# Patient Record
Sex: FEMALE | Race: WHITE | NOT HISPANIC OR LATINO | Employment: STUDENT | URBAN - METROPOLITAN AREA
[De-identification: names, ages, dates, MRNs, and addresses within clinical notes are randomized per-mention and may not be internally consistent; named-entity substitution may affect disease eponyms.]

---
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
## 2024-01-23 ENCOUNTER — TELEPHONE (OUTPATIENT)
Dept: FAMILY MEDICINE CLINIC | Facility: CLINIC | Age: 19
End: 2024-01-23
Payer: COMMERCIAL

## 2024-01-25 NOTE — TELEPHONE ENCOUNTER
[employer] called asking about the stress echo. They are going to cancel this and reschedule since they did not hear anything back. [employer] is asking to do peer to peer or if you just want to cancel this? Please advise.

## 2024-01-26 ENCOUNTER — HOSPITAL ENCOUNTER (OUTPATIENT)
Dept: MRI IMAGING | Facility: HOSPITAL | Age: 19
Discharge: HOME | End: 2024-01-26
Admitting: NURSE PRACTITIONER
Payer: COMMERCIAL

## 2024-01-26 ENCOUNTER — APPOINTMENT (OUTPATIENT)
Dept: CARDIOLOGY | Facility: HOSPITAL | Age: 19
End: 2024-01-26
Payer: COMMERCIAL

## 2024-01-26 DIAGNOSIS — M54.42 CHRONIC BILATERAL LOW BACK PAIN WITH BILATERAL SCIATICA: ICD-10-CM

## 2024-01-26 DIAGNOSIS — G89.29 CHRONIC BILATERAL LOW BACK PAIN WITH BILATERAL SCIATICA: ICD-10-CM

## 2024-01-26 DIAGNOSIS — M54.41 CHRONIC BILATERAL LOW BACK PAIN WITH BILATERAL SCIATICA: ICD-10-CM

## 2024-01-26 PROCEDURE — 72148 MRI LUMBAR SPINE W/O DYE: CPT

## 2024-02-09 ENCOUNTER — TELEPHONE (OUTPATIENT)
Dept: FAMILY MEDICINE CLINIC | Facility: CLINIC | Age: 19
End: 2024-02-09
Payer: COMMERCIAL

## 2024-02-09 DIAGNOSIS — R00.2 HEART PALPITATIONS: Primary | ICD-10-CM

## 2024-02-09 DIAGNOSIS — R00.0 TACHYCARDIA: ICD-10-CM

## 2024-02-09 NOTE — TELEPHONE ENCOUNTER
Called Ivanna, no answer. Left vm for Ivanna to call office back. Spoke with Randi in regards to this and she states next steps is to send her to cardiology.

## 2024-02-09 NOTE — TELEPHONE ENCOUNTER
Mom called regarding patients echo that was supposed to be at 8am this morning. Ivanna (Mom) stated that they never received a call that the appt was canceled. I told Ivanna I would address this with Froy to see what happened with the appointment. We can contact Ivanna or Linnette.

## 2024-02-12 NOTE — TELEPHONE ENCOUNTER
Called and spoke with both Linnette and Ivanna. They have been made aware and Linnette will be coming into the office tomorrow to discuss further options with back pain.

## 2024-02-13 ENCOUNTER — OFFICE VISIT (OUTPATIENT)
Dept: FAMILY MEDICINE CLINIC | Facility: CLINIC | Age: 19
End: 2024-02-13
Payer: COMMERCIAL

## 2024-02-13 VITALS
TEMPERATURE: 98.2 F | WEIGHT: 171.4 LBS | BODY MASS INDEX: 30.37 KG/M2 | HEART RATE: 85 BPM | SYSTOLIC BLOOD PRESSURE: 140 MMHG | DIASTOLIC BLOOD PRESSURE: 67 MMHG | HEIGHT: 63 IN | OXYGEN SATURATION: 98 %

## 2024-02-13 DIAGNOSIS — M47.816 FACET ARTHROPATHY, LUMBAR: ICD-10-CM

## 2024-02-13 DIAGNOSIS — R00.2 HEART PALPITATIONS: Primary | ICD-10-CM

## 2024-02-13 DIAGNOSIS — Z30.41 ENCOUNTER FOR SURVEILLANCE OF CONTRACEPTIVE PILLS: ICD-10-CM

## 2024-02-13 PROCEDURE — 99214 OFFICE O/P EST MOD 30 MIN: CPT | Performed by: NURSE PRACTITIONER

## 2024-02-13 RX ORDER — TIZANIDINE 4 MG/1
4 TABLET ORAL 3 TIMES DAILY
Qty: 60 TABLET | Refills: 0 | Status: SHIPPED | OUTPATIENT
Start: 2024-02-13

## 2024-02-13 RX ORDER — DROSPIRENONE AND ETHINYL ESTRADIOL 0.02-3(28)
1 KIT ORAL DAILY
Qty: 84 TABLET | Refills: 3 | Status: SHIPPED | OUTPATIENT
Start: 2024-02-13

## 2024-02-22 ENCOUNTER — HOSPITAL ENCOUNTER (OUTPATIENT)
Dept: CARDIOLOGY | Facility: HOSPITAL | Age: 19
Discharge: HOME | End: 2024-02-22
Admitting: NURSE PRACTITIONER
Payer: COMMERCIAL

## 2024-02-22 DIAGNOSIS — R00.2 HEART PALPITATIONS: ICD-10-CM

## 2024-02-22 PROCEDURE — 93246 EXT ECG>7D<15D RECORDING: CPT

## 2024-02-28 ENCOUNTER — TREATMENT (OUTPATIENT)
Dept: PHYSICAL THERAPY | Facility: CLINIC | Age: 19
End: 2024-02-28
Payer: COMMERCIAL

## 2024-02-28 DIAGNOSIS — M54.50 RIGHT-SIDED LOW BACK PAIN WITHOUT SCIATICA, UNSPECIFIED CHRONICITY: Primary | ICD-10-CM

## 2024-02-28 DIAGNOSIS — M62.81 MUSCLE WEAKNESS OF PROXIMAL EXTREMITY: ICD-10-CM

## 2024-02-28 NOTE — PROGRESS NOTES
Physical Therapy Initial Evaluation and Plan of Care      [facility]: [address] [place] [address]      Patient: Linnette Fischer   : 2005  Diagnosis/ICD-10 Code:  Right-sided low back pain without sciatica, unspecified chronicity [M54.50]  Referring practitioner: TALHA Henriquez  Date of Initial Visit: 2024  Today's Date: 2024  Patient seen for 1 sessions           Subjective Questionnaire: Oswestry:       Subjective   Pt reports to physical therapy w/ complaints of low back pain. For about 7 months, pt has been having increased pain at the gym and at night. Pt reports they feel like they have to hold something to sit down due to pain. Pt reports the pain does stay local to their back and feels like a stabbing pain. Pt reports the pain is to the middle of their back. Pt reports their pain can increase w/ sitting, walking, and laying flat. Pt reports they can decrease some of their pain w/ stretches (flexing, moving legs side to side). Pt reports they have tried medications, but they do not seem to help. Pt reports their current pain is a 7/10. Pt reports their pain can get as low as a 0/10. Pt reports the pain is infrequent, but is at a majority of a time 7/10. At times of increased pain, their pain can reach a 10/10.       Pt occupation: [employer]; sitting computer.     Past Medical Hx: Elevated HR ( wore a holter monitor, will be following up w/ cardiology)     MRI FINDINGS:          The alignment is anatomic.  The vertebral body heights appear normal.  The bone marrow signal is   within normal limits.  The intervertebral discs appear preserved in signal and height.  The conus   terminates at the L1-2 level.  The posterior paravertebral soft tissues are unremarkable.     L1-2:  Mild bilateral facet arthropathy.  No spinal canal stenosis.  No neural foramina stenosis.     L2-3:  No spinal canal or neural foramina stenosis.     L3-4:  Mild bilateral facet  arthropathy.  No spinal canal stenosis.  No neural foramina stenosis.     L4-5:  No spinal canal or neural foramina stenosis.     L5-S1:  No spinal canal or neural foramina stenosis.     IMPRESSION:               Minor degenerative changes of lumbar spine as described above.         Electronically Signed and Approved By: RAISA DAVIS MD on 1/26/2024 at 8:42      Pain upon conclusion of therapy session: 4/10        Objective          Static Posture     Comments  Maintains anterior pelvic tilt; has decreased pain when moved out of this position.     Palpation   Left   Tenderness of the iliopsoas.     Right Tenderness of the iliopsoas.     Additional Palpation Details  Increased tenderness on the R w/ palpation to psoas. This refers to pt's familiar low back pain.     Tenderness     Lumbar Spine  Tenderness in the facet joint.     Left Hip   No tenderness in the PSIS.     Right Hip   No tenderness in the PSIS.     Additional Tenderness Details  Most pain w/ central PA testing at L2.     Pain unilaterally:   R: L3-4  L: L4-5      Core contraction increases pain in lumbar spine.     Active Range of Motion     Lumbar   Flexion: 40 degrees   Extension: 10 degrees with pain    Additional Active Range of Motion Details  More pain w/ ext     R ROT: WFL  L ROT: 75%      Strength/Myotome Testing     Left Hip   Planes of Motion   Flexion: 4  Extension: 4  Abduction: 4    Right Hip   Planes of Motion   Flexion: 4  Extension: 4  Abduction: 4    Left Knee   Flexion: 5  Extension: 4+    Right Knee   Flexion: 5  Extension: 4+    Additional Strength Details  Feel of pull in their R low back w/ hip flexion and knee ext    Tests     Lumbar     Left   Positive quadrant.     Right   Negative quadrant.     Additional Tests Details  Pt has decreased pain w/ juliette pose stretch as well as w/ proper mechanics for PPT.     Ambulation     Observational Gait   Gait: within functional limits       See Exercise, Manual, and Modality Logs for  complete treatment.       Assessment & Plan       Assessment  Impairments: abnormal coordination, abnormal muscle firing, abnormal or restricted ROM, activity intolerance, impaired physical strength, lacks appropriate home exercise program and pain with function   Functional limitations: carrying objects, lifting, sleeping, walking, pulling, pushing, uncomfortable because of pain, sitting, standing, stooping and unable to perform repetitive tasks   Assessment details: Pt reports to physical therapy w/ complaints of low back pain. Pt has familiar symptoms w/ facet testing as well as w/ activation of hip flexors. Pt presents w/ decreased ROM, decreased strength, decreased ability to contract core musculature, as well as pain w/ functional motions. Pt has increased perceived deficits described by LEDA. Pt does have decreased back pain after performance of HEP exs today. Pt was educated on spinal anatomy and low back pain. Pt will benefit from skilled physical therapy, to address their current impairments and limitations, in order to improve upon levels of pain to return to all ADLs and daily tasks safely and without restrictions.       Prognosis: good    Goals  Plan Goals: LOW BACK PROBLEMS:    1. The patient complains of low back pain.  LTG 1: 12 weeks:  The patient will report a pain rating of 1/10 or better in order to improve  tolerance to activities of daily living and work tasks.  STATUS:  New  STG 1a: 6 weeks:  The patient will report a pain rating of 3/10 or better.  STATUS:  New  TREATMENT:  Therapeutic exercises, manual therapy, aquatic therapy, home exercise   instruction, and modalities as needed for pain to include:  electrical stimulation, moist heat, and ice.      2. The patient demonstrates weakness of the B hip.  LTG 2: 12 weeks:  The patient will demonstrate 5 /5 strength for B hip flexion, abduction,  and extension in order to improve hip stability.  STATUS:  New  STG 2a: 6 weeks:  The patient will  demonstrate 4+ /5 strength for B hip flexion, abduction,  and extension.  STATUS:  New  TREATMENT: Therapeutic exercises, manual therapy, aquatic therapy, home exercise instruction,  and modalities as needed for pain to include:  electrical stimulation, moist heat, and ice.      3. The patient has limited lumbar AROM  LTG 3: 12 weeks:  The patient will demonstrate lumbar AROM as follows: 100% B lumbar rotation, without pain.  STATUS:  New  TREATMENT: Manual therapy, therapeutic exercise, home exercise instruction, and modalities as needed to include: moist heat, and electrical stimulation.       4. Mobility: Walking/Moving Around Functional Limitation    LTG 4: 12 weeks:  The patient will demonstrate 7 % limitation by achieving a score of 3/45 on the LEDA.  STATUS:  New  STG 4 a: 6 weeks:  The patient will demonstrate 20 % limitation by achieving a score of 9/45 on the LEDA.    STATUS:  New  TREATMENT:  Manual therapy, therapeutic exercise, home exercise instruction, and modalities as needed to include: moist heat, electrical stimulation, and ultrasound.           PLAN:  Therapy options: will receive skilled therapy services  Planned modality interventions: Cryotherapy, Heat, and TENS  Planned therapy interventions:balance/weight-bearing training, ADL retraining, soft tissue mobilization, strengthening, stretching, therapeutic activities, manual therapy, joint mobilization, home exercise program/patient education, gait training, functional ROM exercises, flexibility, body mechanics training, postural training, and neuromuscular re-education  Frequency: 2x per week  Duration in weeks: 12  Treatment plan discussed with: patient      Visit Diagnoses:    ICD-10-CM ICD-9-CM   1. Right-sided low back pain without sciatica, unspecified chronicity  M54.50 724.2   2. Muscle weakness of proximal extremity  M62.81 728.87       History # of Personal Factors and/or Comorbidities: LOW (0)  Examination of Body System(s): # of  elements: LOW (1-2)  Clinical Presentation: STABLE   Clinical Decision Making: LOW       Timed:         Manual Therapy:    0     mins  68816;     Therapeutic Exercise:    25     mins  68938;     Neuromuscular Sasha:    0    mins  49812;    Therapeutic Activity:     0     mins  36328;     Gait Trainin     mins  92794;     Ultrasound:     0     mins  64573;    Ionto                               0    mins   81517  Self Care                       0     mins   01597  Canalith Repos    0     mins 39779      Un-Timed:  Electrical Stimulation:    0     mins  41402 ( );  Dry Needling     0     mins self-pay  Traction     0     mins 13270  Low Eval     25     Mins  66967  Mod Eval     0     Mins  59348  High Eval                       0     Mins  14471  Re-Eval                           0    mins  66046    Timed Treatment:   25   mins   Total Treatment:     50   mins    PT SIGNATURE: Renny Lockhart PT, DPT    Electronically signed 2024    [place] License: PT - [id number]    Initial Certification  Certification Period: 2024 thru 2024  I certify that the therapy services are furnished while this patient is under my care.  The services outlined above are required by this patient, and will be reviewed every 90 days.     PHYSICIAN: Randi Singh APRN  NPI: [id number]      DATE:     Please sign and return via fax to [phone]. Thank you, [facility].

## 2024-03-06 ENCOUNTER — TREATMENT (OUTPATIENT)
Dept: PHYSICAL THERAPY | Facility: CLINIC | Age: 19
End: 2024-03-06
Payer: COMMERCIAL

## 2024-03-06 DIAGNOSIS — M54.50 RIGHT-SIDED LOW BACK PAIN WITHOUT SCIATICA, UNSPECIFIED CHRONICITY: Primary | ICD-10-CM

## 2024-03-06 DIAGNOSIS — M62.81 MUSCLE WEAKNESS OF PROXIMAL EXTREMITY: ICD-10-CM

## 2024-03-06 NOTE — PROGRESS NOTES
Outpatient Physical Therapy  [address] [facility], [place] [address]                            Physical Therapy Daily Treatment Note    Patient: Linnette Fischer   : 2005  Diagnosis/ICD-10 Code:  Right-sided low back pain without sciatica, unspecified chronicity [M54.50]  Referring practitioner: TALHA Henriquez  Date of Initial Visit: Type: THERAPY  Noted: 2024  Today's Date: 3/6/2024  Patient seen for 2 sessions           Subjective   Linnette Fischer reports: that her back pain is about the same. States that she is still working out at the gym and it will bother her more if she does back day. Reports that she does a lot of sitting at work and after a while it hurt and she has to stand up.     Objective   Minimal discomfort with MFR to R QL.    See Exercise, Manual, and Modality Logs for complete treatment.     Assessment/Plan  Linnette still experiencing increased R low back  pain, especially when sitting to long. Pt had some increased discomfort with manual therapy. Pt would benefit from skilled PT to address Range of Motion  and Strength deficits, pain management and any concerns with ADLs.       Progress per Plan of Care         Timed:  Manual Therapy:    10     mins  30347;  Therapeutic Exercise:    10     mins  95260;     Neuromuscular Sasha:    10    mins  22131;    Therapeutic Activity:          mins  08696;     Gait Training:           mins  79918;        Untimed:  Electrical Stimulation:         mins  04125 ( );  Mechanical Traction:         mins  67683;       Timed Treatment:   30   mins   Total Treatment:     30   mins      Electronically signed:     Alejandra Hassan PTA  Physical Therapist Assistant  [facility] License #: [id number]

## 2024-03-11 ENCOUNTER — TREATMENT (OUTPATIENT)
Dept: PHYSICAL THERAPY | Facility: CLINIC | Age: 19
End: 2024-03-11
Payer: COMMERCIAL

## 2024-03-11 DIAGNOSIS — M62.81 MUSCLE WEAKNESS OF PROXIMAL EXTREMITY: ICD-10-CM

## 2024-03-11 DIAGNOSIS — M54.50 RIGHT-SIDED LOW BACK PAIN WITHOUT SCIATICA, UNSPECIFIED CHRONICITY: Primary | ICD-10-CM

## 2024-03-11 NOTE — PROGRESS NOTES
Physical Therapy Daily Treatment Note  [place] [facility] [address] [place] [address]    Patient: Linnette Fischer   : 2005  Referring practitioner: TALHA Henriquez  Date of Initial Visit: Type: THERAPY  Noted: 2024  Today's Date: 3/11/2024  Patient seen for 3 sessions           Subjective  Linnette Fischer reports: hr pain comes and goes in waves. She explained that she feels more when she is in the bed. Linnette reported she is attending the gym.      Objective   See Exercise, Manual, and Modality Logs for complete treatment.     Assessment/Plan  Linnette is just beginning care to attend to deficits outlined in IE.    Visit Diagnoses:    ICD-10-CM ICD-9-CM   1. Right-sided low back pain without sciatica, unspecified chronicity  M54.50 724.2   2. Muscle weakness of proximal extremity  M62.81 728.87       Progress per Plan of Care and Progress strengthening /stabilization /functional activity           Timed:  Manual Therapy:         mins  52666;  Therapeutic Exercise:    12     mins  79531;     Neuromuscular Sasha:        mins  36202;    Therapeutic Activity:     15     mins  20817;     Gait Training:           mins  55883;     Ultrasound:          mins  37647;    Electrical Stimulation:         mins  60289 ( );  Aquatics  __   mins   93276    Untimed:  Electrical Stimulation:         mins  88849 ( );  Mechanical Traction:         mins  42574;     Timed Treatment: 27     mins   Total Treatment:     27   mins    Electronically Signed:  Megan Grewal PTA  Physical Therapist Assistant    [place] PTA license [id number]

## 2024-03-13 ENCOUNTER — TREATMENT (OUTPATIENT)
Dept: PHYSICAL THERAPY | Facility: CLINIC | Age: 19
End: 2024-03-13
Payer: COMMERCIAL

## 2024-03-13 DIAGNOSIS — M62.81 MUSCLE WEAKNESS OF PROXIMAL EXTREMITY: ICD-10-CM

## 2024-03-13 DIAGNOSIS — M54.50 RIGHT-SIDED LOW BACK PAIN WITHOUT SCIATICA, UNSPECIFIED CHRONICITY: Primary | ICD-10-CM

## 2024-03-13 NOTE — PROGRESS NOTES
Physical Therapy Daily Treatment Note  [place] [facility] [address] [place] [address]    Patient: Linnette Fischer   : 2005  Referring practitioner: TALHA Henriquez  Date of Initial Visit: Type: THERAPY  Noted: 2024  Today's Date: 3/13/2024  Patient seen for 4 sessions           Subjective  Linnette Fischer reports: she had no pain at arrival.      Objective   See Exercise, Manual, and Modality Logs for complete treatment.     Assessment/Plan  Dead bugs produced back pain so ceased this today. As Linnette progressed with her program, she had no pain. Ongoing care required to address remaining strength deficits to aid pain control.    Visit Diagnoses:    ICD-10-CM ICD-9-CM   1. Right-sided low back pain without sciatica, unspecified chronicity  M54.50 724.2   2. Muscle weakness of proximal extremity  M62.81 728.87       Progress per Plan of Care and Progress strengthening /stabilization /functional activity           Timed:  Manual Therapy:         mins  74578;  Therapeutic Exercise:    8     mins  22963;     Neuromuscular Sasha:        mins  65847;    Therapeutic Activity:     23     mins  55858;     Gait Training:           mins  50817;     Ultrasound:          mins  45200;    Electrical Stimulation:         mins  79316 ( );  Aquatics  __   mins   24391    Untimed:  Electrical Stimulation:         mins  93537 ( );  Mechanical Traction:         mins  35204;     Timed Treatment:   31   mins   Total Treatment:     31   mins    Electronically Signed:  Megan Grewal PTA  Physical Therapist Assistant    [place] PTA license [id number]

## 2024-03-22 ENCOUNTER — OFFICE VISIT (OUTPATIENT)
Dept: CARDIOLOGY | Facility: CLINIC | Age: 19
End: 2024-03-22
Payer: COMMERCIAL

## 2024-03-22 VITALS
HEART RATE: 91 BPM | BODY MASS INDEX: 30.65 KG/M2 | HEIGHT: 63 IN | SYSTOLIC BLOOD PRESSURE: 143 MMHG | WEIGHT: 173 LBS | DIASTOLIC BLOOD PRESSURE: 85 MMHG

## 2024-03-22 DIAGNOSIS — R00.2 PALPITATIONS: Primary | ICD-10-CM

## 2024-03-22 DIAGNOSIS — R06.09 DYSPNEA ON EXERTION: ICD-10-CM

## 2024-03-22 PROCEDURE — 93000 ELECTROCARDIOGRAM COMPLETE: CPT | Performed by: INTERNAL MEDICINE

## 2024-03-22 PROCEDURE — 99204 OFFICE O/P NEW MOD 45 MIN: CPT | Performed by: INTERNAL MEDICINE

## 2024-03-22 NOTE — PROGRESS NOTES
Chief Complaint  Palpitations and Rapid Heart Rate    Subjective            Linnette Fischer presents to [facility]  Palpitations   Associated symptoms include shortness of breath. Pertinent negatives include no chest pain.       18-year-old white female.  She has no previous cardiac history.  She is referred for palpitations.  With exercise she is having what she thinks is extremely high heart rates, her smart watch is at times telling her her heart rate is 200 bpm during cardiovascular type exercise.  She is experiencing some palpitations with this.  Sometimes it occurs with lesser activities.  She has noticed sometimes her heart rate goes up just with standing.  She had a Holter monitor done recently which showed overall average heart rate of 92.  Her maximum heart rate with exertion was 190 which is not unusual for a 18-year-old.  She had otherwise a very low frequency PACs.  She has never had syncope.  She denies chest pain.  No family history of congenital heart disease.    PMH  History reviewed. No pertinent past medical history.      SURGICALHX  Past Surgical History:   Procedure Laterality Date    EAR TUBES          SOC  Social History     Socioeconomic History    Marital status: Single   Tobacco Use    Smoking status: Never     Passive exposure: Never    Smokeless tobacco: Never   Vaping Use    Vaping status: Never Used   Substance and Sexual Activity    Alcohol use: Never    Drug use: Never    Sexual activity: Yes     Partners: Male     Birth control/protection: Birth control pill         FAMHX  Family History   Problem Relation Age of Onset    Osteoporosis Paternal Grandfather     Hypertension Paternal Grandfather     Diabetes Paternal Grandfather     Breast cancer Maternal Grandmother     Hypertension Maternal Grandmother     Colon cancer Neg Hx     Uterine cancer Neg Hx     Ovarian cancer Neg Hx           ALLERGY  No Known Allergies     MEDSCURRENT    Current Outpatient  "Medications:     drospirenone-ethinyl estradiol (NOAM) 3-0.02 MG per tablet, Take 1 tablet by mouth Daily., Disp: 84 tablet, Rfl: 3    diclofenac (VOLTAREN) 75 MG EC tablet, Take 1 tablet by mouth 2 (Two) Times a Day. (Patient not taking: Reported on 3/22/2024), Disp: 60 tablet, Rfl: 0    tiZANidine (ZANAFLEX) 4 MG tablet, Take 1 tablet by mouth 3 (Three) Times a Day. (Patient not taking: Reported on 3/22/2024), Disp: 60 tablet, Rfl: 0      Review of Systems   Constitutional: Negative.   HENT: Negative.     Eyes: Negative.    Cardiovascular:  Positive for palpitations. Negative for chest pain.   Respiratory:  Positive for shortness of breath.    Endocrine: Negative.    Hematologic/Lymphatic: Negative.    Skin: Negative.    Musculoskeletal: Negative.    Gastrointestinal: Negative.    Genitourinary: Negative.    Neurological: Negative.    Psychiatric/Behavioral: Negative.          Objective     /85   Pulse 91   Ht 160 cm (63\")   Wt 78.5 kg (173 lb)   BMI 30.65 kg/m²       General Appearance:   well developed  well nourished  HENT:   oropharynx moist  lips not cyanotic  Neck:  thyroid not enlarged  supple  Respiratory:  no respiratory distress  normal breath sounds  no rales  Cardiovascular:  no jugular venous distention  regular rhythm  apical impulse normal  S1 normal, S2 normal  no S3, no S4   no murmur  no rub, no thrill  carotid pulses normal; no bruit  pedal pulses normal  lower extremity edema: none    Musculoskeletal:  no clubbing of fingers.   normocephalic, head atraumatic  Skin:   warm, dry  Psychiatric:  judgement and insight appropriate  normal mood and affect      Result Review :     The following data was reviewed by: Farooq Juarez MD on 03/22/2024:    CMP          12/15/2023    10:41   CMP   Glucose 81    BUN 12    Creatinine 0.77    EGFR 114.1    Sodium 138    Potassium 4.2    Chloride 104    Calcium 9.6    Total Protein 6.9    Albumin 4.7    Globulin 2.2    Total Bilirubin 0.6  "   Alkaline Phosphatase 51    AST (SGOT) 20    ALT (SGPT) 18    Albumin/Globulin Ratio 2.1    BUN/Creatinine Ratio 15.6    Anion Gap 11.0      CBC          12/15/2023    10:41   CBC   WBC 6.85    RBC 4.25    Hemoglobin 14.0    Hematocrit 39.9    MCV 93.9    MCH 32.9    MCHC 35.1    RDW 11.7    Platelets 188        TSH          10/11/2023    09:11   TSH   TSH 2.320        Data reviewed : Baseline Holter monitor reviewed as above.  Primary care records reviewed         ECG 12 Lead    Date/Time: 3/22/2024 11:49 AM  Performed by: ANGEL Juarez MD    Authorized by: ANGEL Juarez MD  Comparison: not compared with previous ECG   Previous ECG: no previous ECG available  Rhythm: sinus rhythm  Conduction: conduction normal  ST Segments: ST segments normal  T Waves: T waves normal  QRS axis: normal  Other: no other findings    Clinical impression: normal ECG                    Assessment and Plan        ASSESSMENT:  Encounter Diagnoses   Name Primary?    Palpitations Yes    Dyspnea on exertion          PLAN:    1.  Palpitations and dyspnea-her heart rates are elevated with exercise but based on her age achieving a heart rate of 200 would not be unusual.  Her Holter monitor showed no dysrhythmias but she also did not exercise significantly during that time.  Her baseline EKG is normal.  I recommend that she curtail the use of caffeine and preworkout supplements.  A treadmill stress echo will be scheduled to evaluate for structural heart disease or exercise-induced dysrhythmia.  Overall I suspect it is low likelihood that she has underlying heart disease.  2.  We will discuss the diagnostic results when available otherwise the patient will be followed as needed            Patient was given instructions and counseling regarding her condition or for health maintenance advice. Please see specific information pulled into the AVS if appropriate.             ANGEL Juarez MD  3/22/2024    11:47 EDT

## 2024-05-08 ENCOUNTER — OFFICE VISIT (OUTPATIENT)
Dept: FAMILY MEDICINE CLINIC | Facility: CLINIC | Age: 19
End: 2024-05-08
Payer: COMMERCIAL

## 2024-05-08 VITALS
BODY MASS INDEX: 30.41 KG/M2 | OXYGEN SATURATION: 100 % | HEART RATE: 89 BPM | DIASTOLIC BLOOD PRESSURE: 73 MMHG | TEMPERATURE: 98.2 F | HEIGHT: 63 IN | SYSTOLIC BLOOD PRESSURE: 131 MMHG | WEIGHT: 171.6 LBS

## 2024-05-08 DIAGNOSIS — E66.09 CLASS 1 OBESITY DUE TO EXCESS CALORIES WITHOUT SERIOUS COMORBIDITY WITH BODY MASS INDEX (BMI) OF 30.0 TO 30.9 IN ADULT: Primary | ICD-10-CM

## 2024-05-08 PROBLEM — E66.811 CLASS 1 OBESITY DUE TO EXCESS CALORIES WITHOUT SERIOUS COMORBIDITY WITH BODY MASS INDEX (BMI) OF 30.0 TO 30.9 IN ADULT: Status: ACTIVE | Noted: 2024-05-08

## 2024-05-08 PROCEDURE — 99214 OFFICE O/P EST MOD 30 MIN: CPT | Performed by: NURSE PRACTITIONER

## 2024-08-02 ENCOUNTER — OFFICE VISIT (OUTPATIENT)
Dept: FAMILY MEDICINE CLINIC | Facility: CLINIC | Age: 19
End: 2024-08-02
Payer: COMMERCIAL

## 2024-08-02 VITALS
HEIGHT: 63 IN | WEIGHT: 163.4 LBS | OXYGEN SATURATION: 100 % | DIASTOLIC BLOOD PRESSURE: 67 MMHG | HEART RATE: 90 BPM | BODY MASS INDEX: 28.95 KG/M2 | TEMPERATURE: 98.7 F | SYSTOLIC BLOOD PRESSURE: 118 MMHG

## 2024-08-02 DIAGNOSIS — E66.3 OVERWEIGHT WITH BODY MASS INDEX (BMI) OF 28 TO 28.9 IN ADULT: Primary | ICD-10-CM

## 2024-08-02 PROCEDURE — 99213 OFFICE O/P EST LOW 20 MIN: CPT | Performed by: NURSE PRACTITIONER

## 2024-08-02 NOTE — PROGRESS NOTES
Chief Complaint  Class 1 obesity due to excess calories without serious gabriel (3 Mo follow up )    Subjective          Linnette Fischer is a 19 y.o. female who presents to [facility]    History of Present Illness  Obesity:  was 174 and now 163.  Total weight loss 11 pounds.  Goal weight 140 pounds   Zepbound caused her severe nausea and vomiting, extreme fatigue.  States she only took 1 shot from the lowest dose.  Now taking over the counter cortisol and taking Yes you can appetite suppressant and slim fit.  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    PHQ-2 Total Score: 0   PHQ-9 Total Score: 0        Review of Systems       Medical History: has no past medical history on file.     Surgical History: has a past surgical history that includes Ear Tubes Removal.     Family History: family history includes Breast cancer in her maternal grandmother; Diabetes in her paternal grandfather; Hypertension in her maternal grandmother and paternal grandfather; Osteoporosis in her paternal grandfather.     Social History: reports that she has never smoked. She has never been exposed to tobacco smoke. She has never used smokeless tobacco. She reports that she does not drink alcohol and does not use drugs.    Allergies: Patient has no known allergies.      Health Maintenance Due   Topic Date Due    HPV VACCINES (1 - 3-dose series) Never done    INFLUENZA VACCINE  08/01/2024            Current Outpatient Medications:     drospirenone-ethinyl estradiol (NOAM) 3-0.02 MG per tablet, Take 1 tablet by mouth Daily., Disp: 84 tablet, Rfl: 3      Immunization History   Administered Date(s) Administered    COVID-19 (PFIZER) Purple Cap Monovalent 08/03/2021, 09/07/2021         Objective       Vitals:    08/02/24 0704   BP: 118/67   BP Location: Right arm   Patient Position: Sitting   Cuff Size: Adult   Pulse: 90   Temp: 98.7 °F (37.1 °C)   TempSrc: Temporal   SpO2: 100%   Weight: 74.1 kg (163 lb 6.4 oz)   Height: 160  "cm (62.99\")      Body mass index is 28.95 kg/m².   Wt Readings from Last 3 Encounters:   08/02/24 74.1 kg (163 lb 6.4 oz)   05/08/24 77.8 kg (171 lb 9.6 oz)   03/22/24 78.5 kg (173 lb) (93%, Z= 1.44)*     * Growth percentiles are based on [facility] (Girls, 2-20 Years) data.      BP Readings from Last 3 Encounters:   08/02/24 118/67   05/08/24 131/73   03/22/24 143/85           Physical Exam  Vitals reviewed.   Constitutional:       Appearance: Normal appearance.   Cardiovascular:      Rate and Rhythm: Normal rate and regular rhythm.      Pulses: Normal pulses.      Heart sounds: Normal heart sounds.   Pulmonary:      Effort: Pulmonary effort is normal.      Breath sounds: Normal breath sounds.   Skin:     General: Skin is warm and dry.   Neurological:      Mental Status: She is alert and oriented to person, place, and time.   Psychiatric:         Mood and Affect: Mood normal.         Behavior: Behavior normal.         Thought Content: Thought content normal.         Judgment: Judgment normal.             Result Review :       Common labs          12/15/2023    10:41   Common Labs   Glucose 81    BUN 12    Creatinine 0.77    Sodium 138    Potassium 4.2    Chloride 104    Calcium 9.6    Albumin 4.7    Total Bilirubin 0.6    Alkaline Phosphatase 51    AST (SGOT) 20    ALT (SGPT) 18    WBC 6.85    Hemoglobin 14.0    Hematocrit 39.9    Platelets 188                       Assessment and Plan        Diagnoses and all orders for this visit:    1. Overweight with body mass index (BMI) of 28 to 28.9 in adult (Primary)  Comments:  Continue diet and exercise  F/U PRN          Follow Up     Return if symptoms worsen or fail to improve.    Patient was given instructions and counseling regarding her condition or for health maintenance advice. Please see specific information pulled into the AVS if appropriate. Patient understands the importance of having any ordered tests to be performed in a timely fashion.      I spent 10 minutes " caring for Linnette on this date of service. This time includes time spent by me in the following activities: preparing for the visit, reviewing tests, performing a medically appropriate examination and/or evaluation, counseling and educating the patient/family/caregiver, referring and communicating with other health care professionals, documenting information in the medical record, and care coordination      Randi Singh, APRN

## 2024-08-14 ENCOUNTER — DOCUMENTATION (OUTPATIENT)
Dept: PHYSICAL THERAPY | Facility: CLINIC | Age: 19
End: 2024-08-14
Payer: COMMERCIAL

## 2024-08-14 NOTE — PROGRESS NOTES
Outpatient Physical Therapy  [address], [place] [address]      Discharge Summary  Discharge Summary from Physical Therapy Report      Dates  PT visit: 2/28/24-3/13/24  Number of Visits: 4       Goals: Partially Met    Discharge Plan: Continue with current home exercise program as instructed    Date of Discharge 8/14/2024      Electronically signed:   Alejandra Hassan PTA  Physical Therapist Assistant  [place] License #: [id number]

## 2024-11-21 NOTE — PROGRESS NOTES
"Chief Complaint  Mental Health Problem    Subjective          Linnette Fischer is a 19 y.o. female who presents to [facility]    History of Present Illness    States she has been stressing out more, increased anxiety, feels like she can't shut her mind down, not sleeping because can't stop thinking and worrying about everything.  Feels like she doesn't have any motivation.  Having trouble focusing at school, studying is harder because she can't stay on task.    PHQ-2 Total Score:     PHQ-9 Total Score:         Review of Systems       Medical History: has no past medical history on file.     Surgical History: has a past surgical history that includes Ear Tubes Removal.     Family History: family history includes Breast cancer in her maternal grandmother; Diabetes in her paternal grandfather; Hypertension in her maternal grandmother and paternal grandfather; Osteoporosis in her paternal grandfather.     Social History: reports that she has never smoked. She has never been exposed to tobacco smoke. She has never used smokeless tobacco. She reports that she does not drink alcohol and does not use drugs.    Allergies: Patient has no known allergies.      Health Maintenance Due   Topic Date Due    HPV VACCINES (1 - 3-dose series) Never done            Current Outpatient Medications:     drospirenone-ethinyl estradiol (NOAM) 3-0.02 MG per tablet, Take 1 tablet by mouth Daily., Disp: 84 tablet, Rfl: 3    FLUoxetine (PROzac) 20 MG capsule, Take 1 capsule by mouth Daily., Disp: 30 capsule, Rfl: 2      Immunization History   Administered Date(s) Administered    COVID-19 (PFIZER) Purple Cap Monovalent 08/03/2021, 09/07/2021         Objective       Vitals:    11/27/24 0751   BP: 129/83   Pulse: 108   SpO2: 99%   Weight: 74.8 kg (164 lb 12.8 oz)   Height: 160 cm (62.99\")      Body mass index is 29.2 kg/m².   Wt Readings from Last 3 Encounters:   11/27/24 74.8 kg (164 lb 12.8 oz)   08/02/24 " 74.1 kg (163 lb 6.4 oz)   05/08/24 77.8 kg (171 lb 9.6 oz)      BP Readings from Last 3 Encounters:   11/27/24 129/83   08/02/24 118/67   05/08/24 131/73                Physical Exam  Vitals reviewed.   Constitutional:       Appearance: Normal appearance.   Cardiovascular:      Rate and Rhythm: Normal rate and regular rhythm.      Pulses: Normal pulses.      Heart sounds: Normal heart sounds.   Pulmonary:      Effort: Pulmonary effort is normal.      Breath sounds: Normal breath sounds.   Skin:     General: Skin is warm and dry.   Neurological:      Mental Status: She is alert and oriented to person, place, and time.   Psychiatric:         Mood and Affect: Mood normal.         Behavior: Behavior normal.         Thought Content: Thought content normal.         Judgment: Judgment normal.             Result Review :       Common labs          12/15/2023    10:41   Common Labs   Glucose 81    BUN 12    Creatinine 0.77    Sodium 138    Potassium 4.2    Chloride 104    Calcium 9.6    Albumin 4.7    Total Bilirubin 0.6    Alkaline Phosphatase 51    AST (SGOT) 20    ALT (SGPT) 18    WBC 6.85    Hemoglobin 14.0    Hematocrit 39.9    Platelets 188                       Assessment and Plan        Diagnoses and all orders for this visit:    1. Generalized anxiety disorder (Primary)  Comments:  Start on fluoxetione 20 mg, FU in 6 weeks  Orders:  -     FLUoxetine (PROzac) 20 MG capsule; Take 1 capsule by mouth Daily.  Dispense: 30 capsule; Refill: 2          Follow Up     Return in about 6 weeks (around 1/8/2025).    Patient was given instructions and counseling regarding her condition or for health maintenance advice. Please see specific information pulled into the AVS if appropriate. Patient understands the importance of having any ordered tests to be performed in a timely fashion.      I spent 15 minutes caring for Linnette on this date of service. This time includes time spent by me in the following activities: preparing for the  visit, reviewing tests, performing a medically appropriate examination and/or evaluation, counseling and educating the patient/family/caregiver, referring and communicating with other health care professionals, documenting information in the medical record, independently interpreting results and communicating that information with the patient/family/caregiver, care coordination, ordering medications, and ordering test(s)      Randi Singh, APRN

## 2024-11-27 ENCOUNTER — OFFICE VISIT (OUTPATIENT)
Dept: FAMILY MEDICINE CLINIC | Facility: CLINIC | Age: 19
End: 2024-11-27
Payer: COMMERCIAL

## 2024-11-27 VITALS
BODY MASS INDEX: 29.2 KG/M2 | DIASTOLIC BLOOD PRESSURE: 83 MMHG | HEIGHT: 63 IN | OXYGEN SATURATION: 99 % | WEIGHT: 164.8 LBS | HEART RATE: 108 BPM | SYSTOLIC BLOOD PRESSURE: 129 MMHG

## 2024-11-27 DIAGNOSIS — F41.1 GENERALIZED ANXIETY DISORDER: Primary | ICD-10-CM

## 2024-11-27 PROCEDURE — 99213 OFFICE O/P EST LOW 20 MIN: CPT | Performed by: NURSE PRACTITIONER

## 2024-12-06 RX ORDER — ATOMOXETINE 40 MG/1
40 CAPSULE ORAL DAILY
Qty: 30 CAPSULE | Refills: 2 | Status: SHIPPED | OUTPATIENT
Start: 2024-12-06

## 2024-12-10 ENCOUNTER — TELEPHONE (OUTPATIENT)
Dept: FAMILY MEDICINE CLINIC | Facility: CLINIC | Age: 19
End: 2024-12-10
Payer: COMMERCIAL

## 2024-12-10 NOTE — TELEPHONE ENCOUNTER
Caller: Linnette Fischer    Relationship: Self    Best call back number:     [phone]       What was the call regarding: PATIENT STATES THAT HER atomoxetine (Strattera) 40 MG capsule  NEEDS A PRIOR AUTHORIZATION BEFORE HER PHARMACY WILL FILL IT.       PATIENT WOULD LIKE A CALL TO LET HER KNOW WHAT THE INSURANCE SAYS

## 2024-12-31 DIAGNOSIS — Z30.41 ENCOUNTER FOR SURVEILLANCE OF CONTRACEPTIVE PILLS: ICD-10-CM

## 2024-12-31 RX ORDER — DROSPIRENONE AND ETHINYL ESTRADIOL 0.02-3(28)
1 KIT ORAL DAILY
Qty: 84 TABLET | Refills: 3 | Status: SHIPPED | OUTPATIENT
Start: 2024-12-31

## 2025-01-07 NOTE — PROGRESS NOTES
Chief Complaint  Anxiety and Hemorrhoids    Subjective          Linnette Fischer is a 19 y.o. female who presents to [facility]    History of Present Illness    Stopped fluoxetine because it seemed to make her symptoms worse. Now on Strattera 40 mg , she states she had a few side effects at first but now it seems to have helped with her focus, anxiety and stress. Now able to stay on task, staying focused.     Complains of external hemorrhoid that she has been treating with OTC meds for over a month. Stool is soft.    PHQ-2 Total Score:     PHQ-9 Total Score:         Review of Systems   Constitutional:  Negative for chills, diaphoresis, fatigue and fever.   HENT:  Negative for congestion and sore throat.    Respiratory:  Negative for cough.    Cardiovascular:  Negative for chest pain.   Gastrointestinal:  Negative for abdominal pain, nausea and vomiting.   Genitourinary:  Negative for dysuria.   Musculoskeletal:  Negative for myalgias and neck pain.   Skin:  Negative for rash.   Neurological:  Negative for weakness, numbness and headaches.          Medical History: has no past medical history on file.     Surgical History: has a past surgical history that includes Ear Tubes Removal.     Family History: family history includes Breast cancer in her maternal grandmother; Diabetes in her paternal grandfather; Hypertension in her maternal grandmother and paternal grandfather; Osteoporosis in her paternal grandfather.     Social History: reports that she has never smoked. She has never been exposed to tobacco smoke. She has never used smokeless tobacco. She reports that she does not drink alcohol and does not use drugs.    Allergies: Patient has no known allergies.      There are no preventive care reminders to display for this patient.           Current Outpatient Medications:     atomoxetine (Strattera) 40 MG capsule, Take 1 capsule by mouth Daily., Disp: 90 capsule, Rfl: 1     "drospirenone-ethinyl estradiol (NOAM) 3-0.02 MG per tablet, Take 1 tablet by mouth Daily., Disp: 84 tablet, Rfl: 3    hydrocortisone (ANUSOL-HC) 25 MG suppository, Insert 1 suppository into the rectum 2 (Two) Times a Day., Disp: 24 each, Rfl: 1    Hydrocortisone, Perianal, (ANUSOL-HC) 2.5 % rectal cream, Insert  into the rectum 2 (Two) Times a Day., Disp: 28 g, Rfl: 1      Immunization History   Administered Date(s) Administered    COVID-19 (PFIZER) Purple Cap Monovalent 08/03/2021, 09/07/2021    DTaP, Unspecified 2004, 2004, 2004, 08/09/2005, 04/29/2008    FluMist 2-49yrs 10/23/2013, 10/29/2014, 12/03/2015    FluMist 2-49yrs (Nasal) 12/05/2012    Flublok 18+yrs 09/20/2023    Fluzone  >6mos 10/31/2024    Fluzone (or Fluarix & Flulaval for VFC) >6mos 11/09/2017, 11/15/2018, 12/20/2019    Hep A, 2 Dose 06/29/2016, 09/19/2017    Hep B, Unspecified 2004, 2004, 2004, 2004    Hepatitis B 2 Dose Vaccine Heplisav-B 06/04/2024    Hib (PRP-T) 2004, 2004, 2004, 08/09/2005    INFLUENZA NASAL UF 10/22/2010, 10/28/2011    IPV 2004, 2004, 2004, 04/29/2008    MMR 05/10/2005, 04/29/2008    Meningococcal MCV4P (Menactra) 05/23/2016, 03/10/2022    PEDS-Pneumococcal Conjugate (PCV7) 2004, 2004, 05/10/2005, 08/09/2005    Pneumococcal, Unspecified 2004, 2004, 05/10/2005, 08/09/2005    Tdap 05/23/2016    Varicella 05/10/2005, 04/29/2008         Objective       Vitals:    01/10/25 0918   BP: 134/83   Pulse: 101   SpO2: 98%   Weight: 74.8 kg (165 lb)   Height: 160 cm (62.99\")      Body mass index is 29.24 kg/m².   Wt Readings from Last 3 Encounters:   01/10/25 74.8 kg (165 lb)   11/27/24 74.8 kg (164 lb 12.8 oz)   08/02/24 74.1 kg (163 lb 6.4 oz)      BP Readings from Last 3 Encounters:   01/10/25 134/83   11/27/24 129/83   08/02/24 118/67                Physical Exam  Vitals reviewed.   Constitutional:       Appearance: Normal appearance. "   Skin:     General: Skin is warm and dry.   Neurological:      Mental Status: She is alert and oriented to person, place, and time.   Psychiatric:         Mood and Affect: Mood normal.         Behavior: Behavior normal.         Thought Content: Thought content normal.         Judgment: Judgment normal.             Result Review :                          Assessment and Plan        Diagnoses and all orders for this visit:    1. Attention or concentration deficit (Primary)  -     atomoxetine (Strattera) 40 MG capsule; Take 1 capsule by mouth Daily.  Dispense: 90 capsule; Refill: 1    2. External hemorrhoid  -     Hydrocortisone, Perianal, (ANUSOL-HC) 2.5 % rectal cream; Insert  into the rectum 2 (Two) Times a Day.  Dispense: 28 g; Refill: 1  -     hydrocortisone (ANUSOL-HC) 25 MG suppository; Insert 1 suppository into the rectum 2 (Two) Times a Day.  Dispense: 24 each; Refill: 1    3. Encounter for surveillance of contraceptive pills  Comments:  Continue Noam daily  Orders:  -     drospirenone-ethinyl estradiol (NOAM) 3-0.02 MG per tablet; Take 1 tablet by mouth Daily.  Dispense: 84 tablet; Refill: 3          Follow Up     Return in about 6 months (around 7/10/2025), or if symptoms worsen or fail to improve.    Patient was given instructions and counseling regarding her condition or for health maintenance advice. Please see specific information pulled into the AVS if appropriate. Patient understands the importance of having any ordered tests to be performed in a timely fashion.      I spent 10 minutes caring for Linnette on this date of service. This time includes time spent by me in the following activities: preparing for the visit, reviewing tests, performing a medically appropriate examination and/or evaluation, counseling and educating the patient/family/caregiver, referring and communicating with other health care professionals, documenting information in the medical record, independently interpreting results and  communicating that information with the patient/family/caregiver, care coordination, ordering medications, ordering test(s), and ordering procedure(s)      Randi Singh, APRN

## 2025-01-10 ENCOUNTER — OFFICE VISIT (OUTPATIENT)
Dept: FAMILY MEDICINE CLINIC | Facility: CLINIC | Age: 20
End: 2025-01-10
Payer: COMMERCIAL

## 2025-01-10 VITALS
SYSTOLIC BLOOD PRESSURE: 134 MMHG | HEIGHT: 63 IN | DIASTOLIC BLOOD PRESSURE: 83 MMHG | HEART RATE: 101 BPM | BODY MASS INDEX: 29.23 KG/M2 | WEIGHT: 165 LBS | OXYGEN SATURATION: 98 %

## 2025-01-10 DIAGNOSIS — R41.840 ATTENTION OR CONCENTRATION DEFICIT: Primary | ICD-10-CM

## 2025-01-10 DIAGNOSIS — K64.4 EXTERNAL HEMORRHOID: ICD-10-CM

## 2025-01-10 DIAGNOSIS — Z30.41 ENCOUNTER FOR SURVEILLANCE OF CONTRACEPTIVE PILLS: ICD-10-CM

## 2025-01-10 PROCEDURE — 99214 OFFICE O/P EST MOD 30 MIN: CPT | Performed by: NURSE PRACTITIONER

## 2025-01-10 RX ORDER — HYDROCORTISONE 25 MG/G
CREAM TOPICAL 2 TIMES DAILY
Qty: 30 G | Refills: 1 | Status: SHIPPED | OUTPATIENT
Start: 2025-01-10

## 2025-01-10 RX ORDER — ATOMOXETINE 40 MG/1
40 CAPSULE ORAL DAILY
Qty: 90 CAPSULE | Refills: 1 | Status: SHIPPED | OUTPATIENT
Start: 2025-01-10

## 2025-01-10 RX ORDER — HYDROCORTISONE ACETATE 25 MG/1
25 SUPPOSITORY RECTAL 2 TIMES DAILY
Qty: 24 EACH | Refills: 1 | Status: SHIPPED | OUTPATIENT
Start: 2025-01-10

## 2025-01-10 RX ORDER — DROSPIRENONE AND ETHINYL ESTRADIOL 0.02-3(28)
1 KIT ORAL DAILY
Qty: 84 TABLET | Refills: 3 | Status: SHIPPED | OUTPATIENT
Start: 2025-01-10

## 2025-02-04 ENCOUNTER — OFFICE VISIT (OUTPATIENT)
Dept: FAMILY MEDICINE CLINIC | Facility: CLINIC | Age: 20
End: 2025-02-04
Payer: COMMERCIAL

## 2025-02-04 VITALS
BODY MASS INDEX: 29.52 KG/M2 | SYSTOLIC BLOOD PRESSURE: 121 MMHG | HEIGHT: 63 IN | HEART RATE: 101 BPM | WEIGHT: 166.6 LBS | DIASTOLIC BLOOD PRESSURE: 84 MMHG | OXYGEN SATURATION: 99 %

## 2025-02-04 DIAGNOSIS — F90.0 ATTENTION DEFICIT HYPERACTIVITY DISORDER (ADHD), PREDOMINANTLY INATTENTIVE TYPE: ICD-10-CM

## 2025-02-04 DIAGNOSIS — Z30.41 ENCOUNTER FOR SURVEILLANCE OF CONTRACEPTIVE PILLS: ICD-10-CM

## 2025-02-04 DIAGNOSIS — F90.0 ATTENTION DEFICIT HYPERACTIVITY DISORDER (ADHD), PREDOMINANTLY INATTENTIVE TYPE: Primary | ICD-10-CM

## 2025-02-04 DIAGNOSIS — Z79.899 ENCOUNTER FOR LONG-TERM (CURRENT) USE OF HIGH-RISK MEDICATION: ICD-10-CM

## 2025-02-04 LAB
AMPHET+METHAMPHET UR QL: NEGATIVE
AMPHETAMINE INTERNAL CONTROL: ABNORMAL
AMPHETAMINES UR QL: NEGATIVE
BARBITURATE INTERNAL CONTROL: ABNORMAL
BARBITURATES UR QL SCN: NEGATIVE
BENZODIAZ UR QL SCN: NEGATIVE
BENZODIAZEPINE INTERNAL CONTROL: ABNORMAL
BUPRENORPHINE INTERNAL CONTROL: ABNORMAL
BUPRENORPHINE SERPL-MCNC: NEGATIVE NG/ML
CANNABINOIDS SERPL QL: NEGATIVE
COCAINE INTERNAL CONTROL: ABNORMAL
COCAINE UR QL: NEGATIVE
EXPIRATION DATE: ABNORMAL
Lab: ABNORMAL
MDMA (ECSTASY) INTERNAL CONTROL: ABNORMAL
MDMA UR QL SCN: NEGATIVE
METHADONE INTERNAL CONTROL: ABNORMAL
METHADONE UR QL SCN: NEGATIVE
METHAMPHETAMINE INTERNAL CONTROL: ABNORMAL
MORPHINE INTERNAL CONTROL: ABNORMAL
MORPHINE/OPIATES SCREEN, URINE: NEGATIVE
OXYCODONE INTERNAL CONTROL: ABNORMAL
OXYCODONE UR QL SCN: NEGATIVE
PCP UR QL SCN: NEGATIVE
PHENCYCLIDINE INTERNAL CONTROL: ABNORMAL
THC INTERNAL CONTROL: ABNORMAL

## 2025-02-04 PROCEDURE — 80305 DRUG TEST PRSMV DIR OPT OBS: CPT | Performed by: NURSE PRACTITIONER

## 2025-02-04 PROCEDURE — 99214 OFFICE O/P EST MOD 30 MIN: CPT | Performed by: NURSE PRACTITIONER

## 2025-02-04 RX ORDER — METHYLPHENIDATE HYDROCHLORIDE 36 MG/1
36 TABLET ORAL EVERY MORNING
Qty: 30 TABLET | Refills: 0 | Status: SHIPPED | OUTPATIENT
Start: 2025-02-04

## 2025-02-04 RX ORDER — METHYLPHENIDATE HYDROCHLORIDE 36 MG/1
36 TABLET ORAL EVERY MORNING
Start: 2025-02-04 | End: 2025-02-04 | Stop reason: SDUPTHER

## 2025-02-04 RX ORDER — DROSPIRENONE AND ETHINYL ESTRADIOL 0.02-3(28)
1 KIT ORAL DAILY
Qty: 84 TABLET | Refills: 3 | Status: SHIPPED | OUTPATIENT
Start: 2025-02-04

## 2025-02-04 NOTE — PROGRESS NOTES
Chief Complaint  Follow-up (Medication )    Subjective          Linnette Fischer is a 19 y.o. female who presents to [facility]    History of Present Illness    ADD: Strattera 40 mg not working as well since starting back to school. Has trouble concentrating and staying on task. It had improved but its gotten worse again.    PHQ-2 Total Score: 0   PHQ-9 Total Score:         Review of Systems   Constitutional:  Negative for chills, fatigue and fever.   Respiratory:  Negative for cough and shortness of breath.    Cardiovascular:  Negative for chest pain and palpitations.   Gastrointestinal:  Negative for constipation, diarrhea, nausea and vomiting.   Musculoskeletal:  Negative for back pain and neck pain.   Skin:  Negative for rash.   Neurological:  Negative for dizziness and headaches.          Medical History: has no past medical history on file.     Surgical History: has a past surgical history that includes Ear Tubes Removal.     Family History: family history includes Breast cancer in her maternal grandmother; Diabetes in her paternal grandfather; Hypertension in her maternal grandmother and paternal grandfather; Osteoporosis in her paternal grandfather.     Social History: reports that she has never smoked. She has never been exposed to tobacco smoke. She has never used smokeless tobacco. She reports that she does not drink alcohol and does not use drugs.    Allergies: Patient has no known allergies.      Health Maintenance Due   Topic Date Due    HPV VACCINES (1 - 3-dose series) Never done            Current Outpatient Medications:     drospirenone-ethinyl estradiol (NOAM) 3-0.02 MG per tablet, Take 1 tablet by mouth Daily., Disp: 84 tablet, Rfl: 3    hydrocortisone (ANUSOL-HC) 25 MG suppository, Insert 1 suppository into the rectum 2 (Two) Times a Day., Disp: 24 each, Rfl: 1    Hydrocortisone, Perianal, (ANUSOL-HC) 2.5 % rectal cream, Insert  into the rectum 2 (Two) Times a  "Day., Disp: 30 g, Rfl: 1    methylphenidate (Concerta) 36 MG CR tablet, Take 1 tablet by mouth Every Morning, Disp: 30 tablet, Rfl: 0      Immunization History   Administered Date(s) Administered    COVID-19 (PFIZER) Purple Cap Monovalent 08/03/2021, 09/07/2021    DTaP, Unspecified 2004, 2004, 2004, 08/09/2005, 04/29/2008    FluMist 2-49yrs 10/23/2013, 10/29/2014, 12/03/2015    FluMist 2-49yrs (Nasal) 12/05/2012    Flublok 18+yrs 09/20/2023    Fluzone  >6mos 10/31/2024    Fluzone (or Fluarix & Flulaval for VFC) >6mos 11/09/2017, 11/15/2018, 12/20/2019    Hep A, 2 Dose 06/29/2016, 09/19/2017    Hep B, Unspecified 2004, 2004, 2004, 2004    Hepatitis B 2 Dose Vaccine Heplisav-B 06/04/2024    Hib (PRP-T) 2004, 2004, 2004, 08/09/2005    INFLUENZA NASAL UF 10/22/2010, 10/28/2011    IPV 2004, 2004, 2004, 04/29/2008    MMR 05/10/2005, 04/29/2008    Meningococcal MCV4P (Menactra) 05/23/2016, 03/10/2022    PEDS-Pneumococcal Conjugate (PCV7) 2004, 2004, 05/10/2005, 08/09/2005    Pneumococcal, Unspecified 2004, 2004, 05/10/2005, 08/09/2005    Tdap 05/23/2016    Varicella 05/10/2005, 04/29/2008         Objective       Vitals:    02/04/25 0936   BP: 121/84   Pulse: 101   SpO2: 99%   Weight: 75.6 kg (166 lb 9.6 oz)   Height: 160 cm (62.99\")      Body mass index is 29.52 kg/m².   Wt Readings from Last 3 Encounters:   02/04/25 75.6 kg (166 lb 9.6 oz)   01/10/25 74.8 kg (165 lb)   11/27/24 74.8 kg (164 lb 12.8 oz)      BP Readings from Last 3 Encounters:   02/04/25 121/84   01/10/25 134/83   11/27/24 129/83                Physical Exam  Vitals reviewed.   Constitutional:       Appearance: Normal appearance.   HENT:      Head: Atraumatic.   Cardiovascular:      Rate and Rhythm: Normal rate and regular rhythm.      Pulses: Normal pulses.      Heart sounds: Normal heart sounds.   Pulmonary:      Effort: Pulmonary effort is normal.      " Breath sounds: Normal breath sounds.   Skin:     General: Skin is warm and dry.   Neurological:      Mental Status: She is alert and oriented to person, place, and time.   Psychiatric:         Mood and Affect: Mood normal.         Behavior: Behavior normal.         Thought Content: Thought content normal.         Judgment: Judgment normal.             Result Review :                          Assessment and Plan        Diagnoses and all orders for this visit:    1. Attention deficit hyperactivity disorder (ADHD), predominantly inattentive type (Primary)  Comments:  Stop Strattera and start Concerta CR 36 mg  Orders:  -     Discontinue: methylphenidate (Concerta) 36 MG CR tablet; Take 1 tablet by mouth Every Morning    2. Encounter for long-term (current) use of high-risk medication  Comments:  SHADI reviewed and UDS up to date  Orders:  -     POC Medline 12 Panel Urine Drug Screen    3. Encounter for surveillance of contraceptive pills  Comments:  Continue Noam daily  Orders:  -     drospirenone-ethinyl estradiol (NOAM) 3-0.02 MG per tablet; Take 1 tablet by mouth Daily.  Dispense: 84 tablet; Refill: 3          Follow Up     Return in about 4 weeks (around 3/4/2025).    Patient was given instructions and counseling regarding her condition or for health maintenance advice. Please see specific information pulled into the AVS if appropriate. Patient understands the importance of having any ordered tests to be performed in a timely fashion.      Obtained a written consent for SHADI query. Discussed the risks and benefits of the use of controlled substances with the patient, including the risks of tolerance and drug dependence.  The patient has been counseled on the need to have an exit strategy, including potentially discontinuing the use of controlled substances.  SHADI has or will be reviewed as soon as it becomes available.    I spent 15 minutes caring for Linnette on this date of service. This time includes time spent  by me in the following activities: preparing for the visit, reviewing tests, performing a medically appropriate examination and/or evaluation, counseling and educating the patient/family/caregiver, referring and communicating with other health care professionals, documenting information in the medical record, independently interpreting results and communicating that information with the patient/family/caregiver, care coordination, ordering medications, and ordering test(s)      TALHA Henriquez

## 2025-03-04 ENCOUNTER — OFFICE VISIT (OUTPATIENT)
Dept: FAMILY MEDICINE CLINIC | Facility: CLINIC | Age: 20
End: 2025-03-04
Payer: COMMERCIAL

## 2025-03-04 VITALS
HEART RATE: 106 BPM | HEIGHT: 63 IN | BODY MASS INDEX: 29.41 KG/M2 | OXYGEN SATURATION: 99 % | SYSTOLIC BLOOD PRESSURE: 143 MMHG | DIASTOLIC BLOOD PRESSURE: 85 MMHG | WEIGHT: 166 LBS

## 2025-03-04 DIAGNOSIS — Z79.899 ENCOUNTER FOR LONG-TERM (CURRENT) USE OF HIGH-RISK MEDICATION: ICD-10-CM

## 2025-03-04 DIAGNOSIS — F90.0 ATTENTION DEFICIT HYPERACTIVITY DISORDER (ADHD), PREDOMINANTLY INATTENTIVE TYPE: Primary | ICD-10-CM

## 2025-03-04 PROCEDURE — 99214 OFFICE O/P EST MOD 30 MIN: CPT | Performed by: NURSE PRACTITIONER

## 2025-03-04 RX ORDER — DEXTROAMPHETAMINE SACCHARATE, AMPHETAMINE ASPARTATE, DEXTROAMPHETAMINE SULFATE AND AMPHETAMINE SULFATE 3.75; 3.75; 3.75; 3.75 MG/1; MG/1; MG/1; MG/1
15 TABLET ORAL 2 TIMES DAILY
Qty: 60 TABLET | Refills: 0 | Status: SHIPPED | OUTPATIENT
Start: 2025-03-04

## 2025-03-04 NOTE — PROGRESS NOTES
Chief Complaint  ADHD (Follow up)    Subjective          Linnette Fischer is a 19 y.o. female who presents to [facility]    History of Present Illness    ADD: feels like her heart is racing on Concerta 36 mg. Hasn't really noticed a difference in her concentration and focus with the new meds. She could tell a difference when she first started it because she was studying and able to focus but as the month has gone on she doesn't feel like it is helping.    Having trouble remembering to take her birth control all the same time. Period is happening in the beginning of the the new pill pack.  Seeing Selvin Ch in April and will talk to her about Copper IUD.      PHQ-2 Total Score:     PHQ-9 Total Score:       Medical History: has no past medical history on file.     Surgical History: has a past surgical history that includes Ear Tubes Removal.     Family History: family history includes Breast cancer in her maternal grandmother; Diabetes in her paternal grandfather; Hypertension in her maternal grandmother and paternal grandfather; Osteoporosis in her paternal grandfather.     Social History: reports that she has never smoked. She has never been exposed to tobacco smoke. She has never used smokeless tobacco. She reports that she does not drink alcohol and does not use drugs.    Allergies: Patient has no known allergies.      There are no preventive care reminders to display for this patient.         Current Outpatient Medications:     drospirenone-ethinyl estradiol (NOAM) 3-0.02 MG per tablet, Take 1 tablet by mouth Daily., Disp: 84 tablet, Rfl: 3    hydrocortisone (ANUSOL-HC) 25 MG suppository, Insert 1 suppository into the rectum 2 (Two) Times a Day., Disp: 24 each, Rfl: 1    Hydrocortisone, Perianal, (ANUSOL-HC) 2.5 % rectal cream, Insert  into the rectum 2 (Two) Times a Day., Disp: 30 g, Rfl: 1      Immunization History   Administered Date(s) Administered    COVID-19 (PFIZER)  "Purple Cap Monovalent 08/03/2021, 09/07/2021    DTaP, Unspecified 2004, 2004, 2004, 08/09/2005, 04/29/2008    FluMist 2-49yrs 10/23/2013, 10/29/2014, 12/03/2015    FluMist 2-49yrs (Nasal) 12/05/2012    Flublok 18+yrs 09/20/2023    Fluzone  >6mos 10/31/2024    Fluzone (or Fluarix & Flulaval for VFC) >6mos 11/09/2017, 11/15/2018, 12/20/2019    Hep A, 2 Dose 06/29/2016, 09/19/2017    Hep B, Unspecified 2004, 2004, 2004, 2004    Hepatitis B 2 Dose Vaccine Heplisav-B 06/04/2024    Hib (PRP-T) 2004, 2004, 2004, 08/09/2005    INFLUENZA NASAL UF 10/22/2010, 10/28/2011    IPV 2004, 2004, 2004, 04/29/2008    MMR 05/10/2005, 04/29/2008    Meningococcal MCV4P (Menactra) 05/23/2016, 03/10/2022    PEDS-Pneumococcal Conjugate (PCV7) 2004, 2004, 05/10/2005, 08/09/2005    Pneumococcal, Unspecified 2004, 2004, 05/10/2005, 08/09/2005    Tdap 05/23/2016    Varicella 05/10/2005, 04/29/2008         Objective       Vitals:    03/04/25 0833 03/04/25 0834   BP: 146/94 143/85   Pulse: 106    SpO2: 99%    Weight: 75.3 kg (166 lb)    Height: 160 cm (62.99\")       Body mass index is 29.41 kg/m².   Wt Readings from Last 3 Encounters:   03/04/25 75.3 kg (166 lb)   02/04/25 75.6 kg (166 lb 9.6 oz)   01/10/25 74.8 kg (165 lb)      BP Readings from Last 3 Encounters:   03/04/25 143/85   02/04/25 121/84   01/10/25 134/83                Physical Exam  Vitals reviewed.   Constitutional:       Appearance: Normal appearance.   Cardiovascular:      Rate and Rhythm: Normal rate and regular rhythm.      Pulses: Normal pulses.      Heart sounds: Normal heart sounds.   Pulmonary:      Effort: Pulmonary effort is normal.      Breath sounds: Normal breath sounds.   Skin:     General: Skin is warm and dry.   Neurological:      Mental Status: She is alert and oriented to person, place, and time.   Psychiatric:         Mood and Affect: Mood normal.         " Behavior: Behavior normal.         Thought Content: Thought content normal.         Judgment: Judgment normal.       Result Review :                          Assessment and Plan        Diagnoses and all orders for this visit:    1. Attention deficit hyperactivity disorder (ADHD), predominantly inattentive type (Primary)  Comments:  Stop Concerta and start on Adderall 15 mg bid    2. Encounter for long-term (current) use of high-risk medication  Comments:  SHADI reviewed and UDS up to date          Follow Up     Return in about 4 weeks (around 4/1/2025), or if symptoms worsen or fail to improve.    Patient was given instructions and counseling regarding her condition or for health maintenance advice. Please see specific information pulled into the AVS if appropriate. Patient understands the importance of having any ordered tests to be performed in a timely fashion.      I spent 20 minutes caring for Linnette on this date of service. This time includes time spent by me in the following activities: preparing for the visit, reviewing tests, performing a medically appropriate examination and/or evaluation, counseling and educating the patient/family/caregiver, referring and communicating with other health care professionals, documenting information in the medical record, independently interpreting results and communicating that information with the patient/family/caregiver, care coordination, ordering medications, and ordering test(s)      Randi Singh, TALHA

## 2025-03-29 DIAGNOSIS — F90.0 ATTENTION DEFICIT HYPERACTIVITY DISORDER (ADHD), PREDOMINANTLY INATTENTIVE TYPE: ICD-10-CM

## 2025-03-31 RX ORDER — DEXTROAMPHETAMINE SACCHARATE, AMPHETAMINE ASPARTATE, DEXTROAMPHETAMINE SULFATE AND AMPHETAMINE SULFATE 3.75; 3.75; 3.75; 3.75 MG/1; MG/1; MG/1; MG/1
15 TABLET ORAL 2 TIMES DAILY
Qty: 60 TABLET | Refills: 0 | Status: SHIPPED | OUTPATIENT
Start: 2025-03-31 | End: 2025-04-02 | Stop reason: SDUPTHER

## 2025-04-02 ENCOUNTER — TELEPHONE (OUTPATIENT)
Dept: FAMILY MEDICINE CLINIC | Facility: CLINIC | Age: 20
End: 2025-04-02
Payer: COMMERCIAL

## 2025-04-02 DIAGNOSIS — F90.0 ATTENTION DEFICIT HYPERACTIVITY DISORDER (ADHD), PREDOMINANTLY INATTENTIVE TYPE: ICD-10-CM

## 2025-04-02 RX ORDER — DEXTROAMPHETAMINE SACCHARATE, AMPHETAMINE ASPARTATE, DEXTROAMPHETAMINE SULFATE AND AMPHETAMINE SULFATE 3.75; 3.75; 3.75; 3.75 MG/1; MG/1; MG/1; MG/1
15 TABLET ORAL 2 TIMES DAILY
Qty: 60 TABLET | Refills: 0 | Status: SHIPPED | OUTPATIENT
Start: 2025-04-02

## 2025-04-02 NOTE — TELEPHONE ENCOUNTER
Patient is up to date on her UDS and plans to keep May appt. If you feel like its okay to fill patient will followup in May.

## 2025-04-02 NOTE — TELEPHONE ENCOUNTER
Would you be willing to see this patient in an Office Visit slot to refill her medication? Your next est care isnt until May and she is currently scheduled with you in May for that appt.

## 2025-04-02 NOTE — TELEPHONE ENCOUNTER
Pt is needing refills before her appt christi/ [facility] on May 29th-  This will be for Adderall.  Please advise.   Pt is using [facility]- .       Pt-  Phone: [phone]

## 2025-04-24 ENCOUNTER — OFFICE VISIT (OUTPATIENT)
Dept: OBSTETRICS AND GYNECOLOGY | Age: 20
End: 2025-04-24
Payer: COMMERCIAL

## 2025-04-24 ENCOUNTER — TELEPHONE (OUTPATIENT)
Dept: OBSTETRICS AND GYNECOLOGY | Age: 20
End: 2025-04-24

## 2025-04-24 VITALS
DIASTOLIC BLOOD PRESSURE: 86 MMHG | WEIGHT: 155 LBS | BODY MASS INDEX: 27.47 KG/M2 | SYSTOLIC BLOOD PRESSURE: 120 MMHG | HEART RATE: 99 BPM

## 2025-04-24 DIAGNOSIS — Z30.014 ENCOUNTER FOR INITIAL PRESCRIPTION OF INTRAUTERINE CONTRACEPTIVE DEVICE (IUD): Primary | ICD-10-CM

## 2025-04-24 RX ORDER — MISOPROSTOL 200 UG/1
TABLET ORAL
Qty: 2 TABLET | Refills: 0 | Status: SHIPPED | OUTPATIENT
Start: 2025-04-24

## 2025-04-24 NOTE — TELEPHONE ENCOUNTER
Caller: Linnette Fischer    Relationship to patient: Self    Best call back number: [phone]    Chief complaint: PT IS OFF WORK 5/5-5/7 AND 5/9; WOULD LIKE TO AKANKSHA ONE OF THOSE DAYS OFF IF POSSIBLE     Type of visit: PARAGUARD INSERTION     Requested date: 5/5-5/7 OR 5/9     If rescheduling, when is the original appointment: 5/13     Additional notes:PT WOULD LIKE TO HAVE HCG DONE AT [place]

## 2025-04-24 NOTE — PROGRESS NOTES
Chief Complaint   Patient presents with    Contraception     Discuss birth control no annual today will come back for annual          Subjective   HPI  Linnette Fischer is a 20 y.o. female, , LMP was on Patient's last menstrual period was 2025 (exact date). who presents for birth control counseling.    Has been having trouble remembering to take her birth control.  Doesn't want to have a baby right now.   She is interested in trying: IUD    Partner Status: Marital Status: single.      Additional OB/GYN History   Last Pap :   Last Completed Pap Smear    This patient has no relevant Health Maintenance data.       History of abnormal Pap smear: no    OB History          0    Para   0    Term   0       0    AB   0    Living   0         SAB   0    IAB   0    Ectopic   0    Molar   0    Multiple   0    Live Births   0                The additional following portions of the patient's history were reviewed and updated as appropriate: allergies, current medications, past family history, past medical history, past social history, past surgical history, and problem list.    Review of Systems    I have reviewed and agree with the HPI, ROS, and historical information as entered above. Octavio Ch, APRN    Objective   /86   Pulse 99   Wt 70.3 kg (155 lb)   LMP 2025 (Exact Date)   Breastfeeding No   BMI 27.47 kg/m²     Physical Exam    Assessment & Plan     Assessment     Problem List Items Addressed This Visit    None  Visit Diagnoses         Encounter for initial prescription of intrauterine contraceptive device (IUD)    -  Primary    Relevant Medications    miSOPROStol (Cytotec) 200 MCG tablet    Other Relevant Orders    hCG, Quantitative, Pregnancy            Lab(s) Ordered  Medication(s) Ordered  Counseling on use of IUDs provided  Refrain from intercourse for two weeks prior to insertion, BHCG done within 24 hours of procedure  Follow Up: Return in about 2 weeks (around  5/8/2025) for Paragard insertion, beta HCG the day before.      Octavio Ch, APRN  04/24/2025

## 2025-05-06 ENCOUNTER — LAB (OUTPATIENT)
Dept: LAB | Facility: HOSPITAL | Age: 20
End: 2025-05-06
Payer: COMMERCIAL

## 2025-05-06 DIAGNOSIS — Z30.014 ENCOUNTER FOR INITIAL PRESCRIPTION OF INTRAUTERINE CONTRACEPTIVE DEVICE (IUD): ICD-10-CM

## 2025-05-06 LAB — HCG INTACT+B SERPL-ACNC: <1 MIU/ML

## 2025-05-06 PROCEDURE — 84702 CHORIONIC GONADOTROPIN TEST: CPT

## 2025-05-06 PROCEDURE — 36415 COLL VENOUS BLD VENIPUNCTURE: CPT

## 2025-05-07 ENCOUNTER — PROCEDURE VISIT (OUTPATIENT)
Dept: OBSTETRICS AND GYNECOLOGY | Age: 20
End: 2025-05-07
Payer: COMMERCIAL

## 2025-05-07 VITALS
SYSTOLIC BLOOD PRESSURE: 130 MMHG | WEIGHT: 156 LBS | BODY MASS INDEX: 27.63 KG/M2 | HEART RATE: 79 BPM | DIASTOLIC BLOOD PRESSURE: 87 MMHG

## 2025-05-07 DIAGNOSIS — Z30.430 ENCOUNTER FOR IUD INSERTION: Primary | ICD-10-CM

## 2025-05-07 RX ORDER — IBUPROFEN 200 MG
600 TABLET ORAL EVERY 4 HOURS PRN
Status: SHIPPED | OUTPATIENT
Start: 2025-05-07

## 2025-05-07 RX ORDER — COPPER 313.4 MG/1
INTRAUTERINE DEVICE INTRAUTERINE ONCE
Status: COMPLETED | OUTPATIENT
Start: 2025-05-07 | End: 2025-05-07

## 2025-05-07 RX ORDER — COPPER 313.4 MG/1
1 INTRAUTERINE DEVICE INTRAUTERINE
Start: 2025-05-07 | End: 2025-05-12

## 2025-05-07 RX ADMIN — COPPER: 313.4 INTRAUTERINE DEVICE INTRAUTERINE at 14:50

## 2025-05-07 RX ADMIN — Medication 600 MG: at 14:54

## 2025-05-07 NOTE — PROGRESS NOTES
Procedures  IUD Insertion Procedure Note    Indication: Desires long acting reversible contraception     Procedure Details   Beta HCG was done and was NEGATIVE .  The risks (including infection, bleeding, pain, and uterine perforation) and benefits of the procedure were explained to the patient and Written informed consent was obtained.      Cervix cleansed with Betadine. Uterus sounded to 7 cm. IUD inserted without difficulty. String visible and trimmed.    IUD Information:  ParaGard, Lot # [id number], Expiration date 04/30/2030.    Condition:  Stable    Complications:  None    Patient tolerated the procedure well without complications.    Plan:  The patient was advised to call for any fever or for prolonged or severe pain or bleeding. She was advised to use OTC ibuprofen as needed for mild to moderate pain.       Octavio Ch, APRN  5/7/2025  14:53 EDT

## 2025-05-12 ENCOUNTER — OFFICE VISIT (OUTPATIENT)
Dept: OBSTETRICS AND GYNECOLOGY | Age: 20
End: 2025-05-12
Payer: COMMERCIAL

## 2025-05-12 VITALS
HEART RATE: 117 BPM | BODY MASS INDEX: 27.46 KG/M2 | WEIGHT: 155 LBS | SYSTOLIC BLOOD PRESSURE: 132 MMHG | DIASTOLIC BLOOD PRESSURE: 100 MMHG

## 2025-05-12 DIAGNOSIS — Z30.432 ENCOUNTER FOR IUD REMOVAL: Primary | ICD-10-CM

## 2025-05-12 RX ORDER — NORETHINDRONE ACETATE AND ETHINYL ESTRADIOL 1.5-30(21)
1 KIT ORAL DAILY
Qty: 84 TABLET | Refills: 3 | Status: SHIPPED | OUTPATIENT
Start: 2025-05-12 | End: 2026-05-12

## 2025-05-12 NOTE — PROGRESS NOTES
Went to the bathroom on Friday, could feel the strings hanging out.  Has been bleeding and having increased cramping.   Procedures  IUD Removal Procedure Note    Type of IUD:  ParaGard  Date of insertion:  known  Reason for removal:  Side effect: bleeding and cramping  Other relevant history/information:  Offered to check position with ultrasound, patient desires removal.    Procedure Time Documentation  The risks of the procedure were reviewed with the patient including bleeding, infection and unlikely damage to the uterus and the benefits of the procedure were explained to the patient and Written informed consent was obtained    Procedure Details  IUD strings visible:  yes  Local anesthesia:  None  Tenaculum used:  None  Removal:  IUD strings grasped and IUD removed intact with gentle traction.  The patient tolerated the procedure well.    All appropriate instructions regarding removal were reviewed.    Patient tolerated the procedure well without complications.    Plans for contraception:  oral contraceptives (estrogen/progesterone)    Other follow-up needed:  Keep scheduled appointment for pap/annual    The patient was advised to call for any fever or for prolonged or severe pain or bleeding. She was advised to use OTC ibuprofen as needed for mild to moderate pain.     Octavio Ch, APRN  5/12/2025  08:32 EDT

## 2025-05-13 ENCOUNTER — TELEPHONE (OUTPATIENT)
Dept: OBSTETRICS AND GYNECOLOGY | Age: 20
End: 2025-05-13

## 2025-05-13 DIAGNOSIS — R53.83 FATIGUE, UNSPECIFIED TYPE: Primary | ICD-10-CM

## 2025-05-13 NOTE — TELEPHONE ENCOUNTER
Please call patient, I can place orders for labs but ensure she knows that any necessary management other than OTC supplements would need to be managed by her PCP.  Please let me know how she would like to proceed.

## 2025-05-14 ENCOUNTER — LAB (OUTPATIENT)
Facility: HOSPITAL | Age: 20
End: 2025-05-14
Payer: COMMERCIAL

## 2025-05-14 DIAGNOSIS — R53.83 FATIGUE, UNSPECIFIED TYPE: ICD-10-CM

## 2025-05-14 LAB
25(OH)D3 SERPL-MCNC: 54.7 NG/ML (ref 30–100)
DEPRECATED RDW RBC AUTO: 37.2 FL (ref 37–54)
ERYTHROCYTE [DISTWIDTH] IN BLOOD BY AUTOMATED COUNT: 11.3 % (ref 12.3–15.4)
FOLATE SERPL-MCNC: 10.2 NG/ML (ref 4.78–24.2)
HCT VFR BLD AUTO: 40.5 % (ref 34–46.6)
HGB BLD-MCNC: 14.1 G/DL (ref 12–15.9)
IRON 24H UR-MRATE: 131 MCG/DL (ref 37–145)
IRON SATN MFR SERPL: 31 % (ref 20–50)
MCH RBC QN AUTO: 31.7 PG (ref 26.6–33)
MCHC RBC AUTO-ENTMCNC: 34.8 G/DL (ref 31.5–35.7)
MCV RBC AUTO: 91 FL (ref 79–97)
PLATELET # BLD AUTO: 240 10*3/MM3 (ref 140–450)
PMV BLD AUTO: 9.9 FL (ref 6–12)
RBC # BLD AUTO: 4.45 10*6/MM3 (ref 3.77–5.28)
T4 FREE SERPL-MCNC: 1.62 NG/DL (ref 0.92–1.68)
TIBC SERPL-MCNC: 428 MCG/DL (ref 298–536)
TRANSFERRIN SERPL-MCNC: 287 MG/DL (ref 200–360)
TSH SERPL DL<=0.05 MIU/L-ACNC: 1.26 UIU/ML (ref 0.27–4.2)
VIT B12 BLD-MCNC: 740 PG/ML (ref 211–946)
WBC NRBC COR # BLD AUTO: 7.5 10*3/MM3 (ref 3.4–10.8)

## 2025-05-14 PROCEDURE — 36415 COLL VENOUS BLD VENIPUNCTURE: CPT

## 2025-05-14 PROCEDURE — 82607 VITAMIN B-12: CPT

## 2025-05-14 PROCEDURE — 84439 ASSAY OF FREE THYROXINE: CPT

## 2025-05-14 PROCEDURE — 85027 COMPLETE CBC AUTOMATED: CPT

## 2025-05-14 PROCEDURE — 84466 ASSAY OF TRANSFERRIN: CPT

## 2025-05-14 PROCEDURE — 84443 ASSAY THYROID STIM HORMONE: CPT

## 2025-05-14 PROCEDURE — 82306 VITAMIN D 25 HYDROXY: CPT

## 2025-05-14 PROCEDURE — 82746 ASSAY OF FOLIC ACID SERUM: CPT

## 2025-05-14 PROCEDURE — 83540 ASSAY OF IRON: CPT

## 2025-05-19 ENCOUNTER — RESULTS FOLLOW-UP (OUTPATIENT)
Dept: OBSTETRICS AND GYNECOLOGY | Age: 20
End: 2025-05-19
Payer: COMMERCIAL

## 2025-05-29 ENCOUNTER — OFFICE VISIT (OUTPATIENT)
Dept: FAMILY MEDICINE CLINIC | Facility: CLINIC | Age: 20
End: 2025-05-29
Payer: COMMERCIAL

## 2025-05-29 ENCOUNTER — LAB (OUTPATIENT)
Dept: LAB | Facility: HOSPITAL | Age: 20
End: 2025-05-29
Payer: COMMERCIAL

## 2025-05-29 VITALS
BODY MASS INDEX: 27.39 KG/M2 | WEIGHT: 154.6 LBS | HEIGHT: 63 IN | SYSTOLIC BLOOD PRESSURE: 119 MMHG | TEMPERATURE: 97.6 F | HEART RATE: 99 BPM | OXYGEN SATURATION: 98 % | DIASTOLIC BLOOD PRESSURE: 73 MMHG

## 2025-05-29 DIAGNOSIS — Z79.899 MEDICATION MANAGEMENT: ICD-10-CM

## 2025-05-29 DIAGNOSIS — Z00.00 ANNUAL PHYSICAL EXAM: ICD-10-CM

## 2025-05-29 DIAGNOSIS — F90.0 ATTENTION DEFICIT HYPERACTIVITY DISORDER (ADHD), PREDOMINANTLY INATTENTIVE TYPE: Primary | ICD-10-CM

## 2025-05-29 LAB
ALBUMIN SERPL-MCNC: 4.3 G/DL (ref 3.5–5.2)
ALBUMIN/GLOB SERPL: 1.5 G/DL
ALP SERPL-CCNC: 58 U/L (ref 39–117)
ALT SERPL W P-5'-P-CCNC: 12 U/L (ref 1–33)
AMPHET+METHAMPHET UR QL: NEGATIVE
AMPHETAMINE INTERNAL CONTROL: NORMAL
AMPHETAMINES UR QL: NEGATIVE
ANION GAP SERPL CALCULATED.3IONS-SCNC: 10.7 MMOL/L (ref 5–15)
AST SERPL-CCNC: 19 U/L (ref 1–32)
BARBITURATE INTERNAL CONTROL: NORMAL
BARBITURATES UR QL SCN: NEGATIVE
BENZODIAZ UR QL SCN: NEGATIVE
BENZODIAZEPINE INTERNAL CONTROL: NORMAL
BILIRUB SERPL-MCNC: 0.5 MG/DL (ref 0–1.2)
BUN SERPL-MCNC: 8 MG/DL (ref 6–20)
BUN/CREAT SERPL: 9.6 (ref 7–25)
BUPRENORPHINE INTERNAL CONTROL: NORMAL
BUPRENORPHINE SERPL-MCNC: NEGATIVE NG/ML
CALCIUM SPEC-SCNC: 9.6 MG/DL (ref 8.6–10.5)
CANNABINOIDS SERPL QL: NEGATIVE
CHLORIDE SERPL-SCNC: 105 MMOL/L (ref 98–107)
CHOLEST SERPL-MCNC: 172 MG/DL (ref 0–200)
CO2 SERPL-SCNC: 24.3 MMOL/L (ref 22–29)
COCAINE INTERNAL CONTROL: NORMAL
COCAINE UR QL: NEGATIVE
CREAT SERPL-MCNC: 0.83 MG/DL (ref 0.57–1)
EGFRCR SERPLBLD CKD-EPI 2021: 103 ML/MIN/1.73
EXPIRATION DATE: NORMAL
GLOBULIN UR ELPH-MCNC: 2.8 GM/DL
GLUCOSE SERPL-MCNC: 88 MG/DL (ref 65–99)
HDLC SERPL-MCNC: 84 MG/DL (ref 40–60)
LDLC SERPL CALC-MCNC: 68 MG/DL (ref 0–100)
LDLC/HDLC SERPL: 0.77 {RATIO}
Lab: NORMAL
MDMA (ECSTASY) INTERNAL CONTROL: NORMAL
MDMA UR QL SCN: NEGATIVE
METHADONE INTERNAL CONTROL: NORMAL
METHADONE UR QL SCN: NEGATIVE
METHAMPHETAMINE INTERNAL CONTROL: NORMAL
MORPHINE INTERNAL CONTROL: NORMAL
MORPHINE/OPIATES SCREEN, URINE: NEGATIVE
OXYCODONE INTERNAL CONTROL: NORMAL
OXYCODONE UR QL SCN: NEGATIVE
PCP UR QL SCN: NEGATIVE
PHENCYCLIDINE INTERNAL CONTROL: NORMAL
POTASSIUM SERPL-SCNC: 4 MMOL/L (ref 3.5–5.2)
PROT SERPL-MCNC: 7.1 G/DL (ref 6–8.5)
SODIUM SERPL-SCNC: 140 MMOL/L (ref 136–145)
THC INTERNAL CONTROL: NORMAL
TRIGL SERPL-MCNC: 118 MG/DL (ref 0–150)
TSH SERPL DL<=0.05 MIU/L-ACNC: 1.46 UIU/ML (ref 0.27–4.2)
VLDLC SERPL-MCNC: 20 MG/DL (ref 5–40)

## 2025-05-29 PROCEDURE — 36415 COLL VENOUS BLD VENIPUNCTURE: CPT | Performed by: STUDENT IN AN ORGANIZED HEALTH CARE EDUCATION/TRAINING PROGRAM

## 2025-05-29 PROCEDURE — 80061 LIPID PANEL: CPT | Performed by: STUDENT IN AN ORGANIZED HEALTH CARE EDUCATION/TRAINING PROGRAM

## 2025-05-29 PROCEDURE — 80053 COMPREHEN METABOLIC PANEL: CPT | Performed by: STUDENT IN AN ORGANIZED HEALTH CARE EDUCATION/TRAINING PROGRAM

## 2025-05-29 PROCEDURE — 84443 ASSAY THYROID STIM HORMONE: CPT | Performed by: STUDENT IN AN ORGANIZED HEALTH CARE EDUCATION/TRAINING PROGRAM

## 2025-05-29 RX ORDER — DEXTROAMPHETAMINE SACCHARATE, AMPHETAMINE ASPARTATE MONOHYDRATE, DEXTROAMPHETAMINE SULFATE AND AMPHETAMINE SULFATE 3.75; 3.75; 3.75; 3.75 MG/1; MG/1; MG/1; MG/1
15 CAPSULE, EXTENDED RELEASE ORAL EVERY MORNING
Qty: 30 CAPSULE | Refills: 0 | Status: SHIPPED | OUTPATIENT
Start: 2025-05-29

## 2025-05-29 NOTE — PROGRESS NOTES
Adult Female Preventive Health Visit  DOS: 25    Patient: Linnette Fischer  :  2005  Age: 20 y.o.  Gender: female   MRN: [id number]    Chief Complaint:   Annual Health Maintenance  Establish Care    Subjective   Patient is here for annual physical. Other complaints are ADD    History of Present Illness  The patient is a 20-year-old female who presents for evaluation of ADD, weight management, and health maintenance.    She is currently on a regimen of Adderall administered 2 times daily. However, she expresses dissatisfaction with the twice-daily dosage due to occasional forgetfulness in taking the second dose. She reports a noticeable decline in her condition towards the end of the day when the second dose is due.      She experienced severe side effects from an IUD, including heavy bleeding, rash, and vomiting, leading to its removal 6 days post-insertion. She has never been pregnant and maintains regular menstrual cycles. She is scheduled for a Pap smear with her OB-GYN. She is currently on birth control due to heavy menstrual bleeding        Allergies:   No Known Allergies  Medications:  Current Outpatient Medications on File Prior to Visit   Medication Sig Dispense Refill    norethindrone-ethinyl estradiol-iron (Junel ) 1.5-30 MG-MCG tablet Take 1 tablet by mouth Daily. 84 tablet 3    [DISCONTINUED] amphetamine-dextroamphetamine (Adderall) 15 MG tablet Take 1 tablet by mouth 2 (Two) Times a Day. 60 tablet 0    cetirizine (zyrTEC) 10 MG tablet Take 1 tablet by mouth Daily. (Patient not taking: Reported on 2025) 90 tablet 0    fluticasone (FLONASE) 50 MCG/ACT nasal spray Administer 2 sprays into the nostril(s) as directed by provider Daily. (Patient not taking: Reported on 2025) 16 g 0    hydrocortisone (ANUSOL-HC) 25 MG suppository Insert 1 suppository into the rectum 2 (Two) Times a Day. (Patient not taking: Reported on 2025) 24 each 1    Hydrocortisone, Perianal,  (ANUSOL-HC) 2.5 % rectal cream Insert  into the rectum 2 (Two) Times a Day. (Patient not taking: Reported on 5/29/2025) 30 g 1     Current Facility-Administered Medications on File Prior to Visit   Medication Dose Route Frequency Provider Last Rate Last Admin    [DISCONTINUED] ibuprofen (ADVIL,MOTRIN) tablet 600 mg  600 mg Oral Q4H PRN Octavio Ch APRN   600 mg at 05/07/25 2599      I have reviewed and updated as appropriate the past medical, surgical, family, and social history as summarized below:  Past Medical, Social and Family History:     Past Medical History:   Diagnosis Date    Low back pain      Social History     Tobacco Use    Smoking status: Never     Passive exposure: Never    Smokeless tobacco: Never   Substance Use Topics    Alcohol use: Never     Family History   Problem Relation Age of Onset    Osteoporosis Paternal Grandfather     Hypertension Paternal Grandfather     Diabetes Paternal Grandfather     Stroke Paternal Grandfather     Breast cancer Maternal Grandmother     Hypertension Maternal Grandmother     Cancer Maternal Grandmother     Colon cancer Neg Hx     Uterine cancer Neg Hx     Ovarian cancer Neg Hx      Immunization History   Administered Date(s) Administered    COVID-19 (PFIZER) Purple Cap Monovalent 08/03/2021, 09/07/2021    DTaP, Unspecified 2004, 2004, 2004, 08/09/2005, 04/29/2008    FluMist 2-49yrs 10/23/2013, 10/29/2014, 12/03/2015    FluMist 2-49yrs (Nasal) 12/05/2012    Flublok 18+yrs 09/20/2023    Fluzone  >6mos 10/31/2024    Fluzone (or Fluarix & Flulaval for VFC) >6mos 11/09/2017, 11/15/2018, 12/20/2019    Hep A, 2 Dose 06/29/2016, 09/19/2017    Hep B, Unspecified 2004, 2004, 2004, 2004    Hepatitis B 2 Dose Vaccine Heplisav-B 06/04/2024    Hib (PRP-T) 2004, 2004, 2004, 08/09/2005    INFLUENZA NASAL UF 10/22/2010, 10/28/2011    IPV 2004, 2004, 2004, 04/29/2008    MMR 05/10/2005, 04/29/2008     "Meningococcal MCV4P (Menactra) 05/23/2016, 03/10/2022    PEDS-Pneumococcal Conjugate (PCV7) 2004, 2004, 05/10/2005, 08/09/2005    Pneumococcal, Unspecified 2004, 2004, 05/10/2005, 08/09/2005    Tdap 05/23/2016    Varicella 05/10/2005, 04/29/2008           Objective   Vital Signs:   Vitals:    05/29/25 1417   BP: 119/73   BP Location: Left arm   Patient Position: Sitting   Cuff Size: Adult   Pulse: 99   Temp: 97.6 °F (36.4 °C)   TempSrc: Temporal   SpO2: 98%   Weight: 70.1 kg (154 lb 9.6 oz)   Height: 160 cm (62.99\")     Body mass index is 27.39 kg/m².    Wt Readings from Last 3 Encounters:   05/29/25 70.1 kg (154 lb 9.6 oz)   05/12/25 70.3 kg (155 lb)   05/07/25 70.8 kg (156 lb)     BP Readings from Last 3 Encounters:   05/29/25 119/73   05/12/25 132/100   05/07/25 130/87       Review of Systems    Physical Exam  Constitutional:       Appearance: Normal appearance.   HENT:      Head: Normocephalic and atraumatic.      Right Ear: Tympanic membrane normal.      Left Ear: Tympanic membrane normal.      Mouth/Throat:      Mouth: Mucous membranes are moist.   Eyes:      Pupils: Pupils are equal, round, and reactive to light.   Cardiovascular:      Rate and Rhythm: Normal rate and regular rhythm.      Pulses: Normal pulses.      Heart sounds: Normal heart sounds.   Pulmonary:      Effort: Pulmonary effort is normal.      Breath sounds: Normal breath sounds. No stridor. No wheezing.   Abdominal:      Palpations: Abdomen is soft.      Tenderness: There is no abdominal tenderness.      Hernia: No hernia is present.   Musculoskeletal:         General: No swelling or tenderness.   Skin:     General: Skin is warm.   Neurological:      General: No focal deficit present.      Mental Status: She is alert and oriented to person, place, and time.   Psychiatric:         Mood and Affect: Mood normal.         Behavior: Behavior normal.         Physical Exam  Respiratory: Clear to auscultation, no wheezing, rales " or rhonchi       Health Maintenance   Topic Date Due    PAP SMEAR  Never done    MENINGOCOCCAL B VACCINE (1 of 2 - Standard) 11/25/2025 (Originally 4/22/2020)    HPV VACCINES (1 - 3-dose series) 11/25/2025 (Originally 4/22/2019)    INFLUENZA VACCINE  07/01/2025    ANNUAL PHYSICAL  08/02/2025    TDAP/TD VACCINES (2 - Td or Tdap) 05/23/2026    HEPATITIS C SCREENING  Completed    MENINGOCOCCAL VACCINE  Completed    Pneumococcal Vaccine 0-49  Aged Out    COVID-19 Vaccine  Discontinued    CHLAMYDIA SCREENING  Discontinued       Lifestyle:  Marital Status: not   Household members: Family   Work Status: employed  Weight Concerns: No  Balanced diet: Yes  Regular Exercise: Yes    BMI is >= 25 and <30. (Overweight) The following options were offered after discussion;: exercise counseling/recommendations and nutrition counseling/recommendations      Fall Risk  STEADI Fall Risk Assessment has not been completed.    Social Screening Questions:  Linnette Fischer  reports that she has never smoked. She has never been exposed to tobacco smoke. She has never used smokeless tobacco.      Drug Use: denied.  Alcohol Use: none  Sexually Active: Yes  Sexual Preference: heterosexual  Contraception:  oral contraceptives (estrogen/progesterone)    PHQ-9 Depression Screening  Little interest or pleasure in doing things? Not at all   Feeling down, depressed, or hopeless? Not at all   PHQ-2 Total Score 0   Trouble falling or staying asleep, or sleeping too much?     Feeling tired or having little energy?     Poor appetite or overeating?     Feeling bad about yourself - or that you are a failure or have let yourself or your family down?     Trouble concentrating on things, such as reading the newspaper or watching television?     Moving or speaking so slowly that other people could have noticed? Or the opposite - being so fidgety or restless that you have been moving around a lot more than usual?     Thoughts that you would be better  off dead, or of hurting yourself in some way?     PHQ-9 Total Score     If you checked off any problems, how difficult have these problems made it for you to do your work, take care of things at home, or get along with other people?         Vision/Hearing/Dental  Regular Dental Visits: Yes  Vision Problems: No  Hearing Loss: No    Health Screening  Breast Cancer Screening:   Last Completed Mammogram    This patient has no relevant Health Maintenance data.       Pap Smear:   Last Completed Pap Smear    This patient has no relevant Health Maintenance data.       Colon Cancer Screening:   Last Completed Colonoscopy    This patient has no relevant Health Maintenance data.       Breast cancer screening: N/A  Pap smear: By OB  Colon Screening Methold: N/A  Last DEXA: N/A  Lung Cancer Screening: N/A    Reproductive Health  Pregnancy History:    0   Para 0   AB Spont 0   AB Elective 0  Menstrual history: Regular    Safety  Smoke Detectors in Home: Yes  Carbon Monoxide Detectors in Home: Yes  Seatbelt use: Yes  Sunscreen Use: Yes    Results  Labs   - Iron levels: Fine   - Vitamin D: Fine   - Folate: Fine   - Neutrophils: Slightly elevated   - Lymphocytes: Slightly low but returned to normal upon recheck       Assessment   Diagnoses and all orders for this visit:    1. Attention deficit hyperactivity disorder (ADHD), predominantly inattentive type (Primary)  -     Cancel: POC 12 Panel Urine Drug Screen  -     amphetamine-dextroamphetamine XR (Adderall XR) 15 MG 24 hr capsule; Take 1 capsule by mouth Every Morning  Dispense: 30 capsule; Refill: 0    2. Annual physical exam  -     Comprehensive Metabolic Panel  -     Lipid Panel  -     TSH Rfx On Abnormal To Free T4  -     Urinalysis With Microscopic - Urine, Clean Catch    3. Medication management  -     POC Medline 12 Panel Urine Drug Screen        Assessment & Plan  1. Attention Deficit Disorder (ADD).  - She is currently on Adderall 15 mg , 2 times a day but prefers  a once-daily regimen due to forgetfulness and inconsistent dosing.  - A prescription for Adderall 15 mg XR will be sent to pharmacy.  - UDS obtained    2. Weight management.  -Requested dietary modifications, daily exercise and weight management    3. Health Maintenance.  - Her iron levels are within normal range  - Her vitamin D and folate levels are satisfactory.  - A complete blood count (CBC), comprehensive metabolic panel (CMP), lipid panel, thyroid-stimulating hormone (TSH), and urine tests will be ordered as part of her annual screening.    Follow-up  - The patient will follow up in 3 months.          Return in about 3 months (around 8/29/2025).    Patient Care Team:  Lucretia Leonardo MD as PCP - General (Family Medicine)    Electronically signed by Lucretia Leonardo MD, 05/29/25, 2:36 PM EDT.    Patient or patient representative verbalized consent for the use of Ambient Listening during the visit with  Lucretia Leonardo MD for chart documentation. 5/29/2025  14:39 EDT

## 2025-06-04 ENCOUNTER — OFFICE VISIT (OUTPATIENT)
Dept: OBSTETRICS AND GYNECOLOGY | Age: 20
End: 2025-06-04
Payer: COMMERCIAL

## 2025-06-04 VITALS
HEIGHT: 63 IN | SYSTOLIC BLOOD PRESSURE: 109 MMHG | WEIGHT: 154.2 LBS | DIASTOLIC BLOOD PRESSURE: 78 MMHG | HEART RATE: 109 BPM | BODY MASS INDEX: 27.32 KG/M2

## 2025-06-04 DIAGNOSIS — Z01.419 ENCOUNTER FOR GYNECOLOGICAL EXAMINATION WITHOUT ABNORMAL FINDING: Primary | ICD-10-CM

## 2025-06-04 PROCEDURE — 87661 TRICHOMONAS VAGINALIS AMPLIF: CPT | Performed by: NURSE PRACTITIONER

## 2025-06-04 PROCEDURE — G0123 SCREEN CERV/VAG THIN LAYER: HCPCS | Performed by: NURSE PRACTITIONER

## 2025-06-04 PROCEDURE — 87491 CHLMYD TRACH DNA AMP PROBE: CPT | Performed by: NURSE PRACTITIONER

## 2025-06-04 PROCEDURE — 87591 N.GONORRHOEAE DNA AMP PROB: CPT | Performed by: NURSE PRACTITIONER

## 2025-06-04 NOTE — PROGRESS NOTES
"Well Woman Visit    CC: Scheduled annual well gyn visit  Chief Complaint   Patient presents with    Contraception     IUD check          HPI:   20 y.o.   Social History     Substance and Sexual Activity   Sexual Activity Yes    Partners: Male    Birth control/protection: Condom, Birth control pill       Menses: No cycle since IUD removed, should start in the next few days     Patient is 23 yo or younger and DESIRES GC/CT testing today    PCP: does manage PMHx and preventative labs  History: PMHx, Meds, Allergies, PSHx, Social Hx, and POBHx all reviewed and updated.    Pt has no complaints today.    PHYSICAL EXAM:  /78   Pulse 109   Ht 160 cm (62.99\")   Wt 69.9 kg (154 lb 3.2 oz)   BMI 27.32 kg/m²  Not found.     Exam conducted with a chaperone present  General- NAD, alert and oriented, appropriate  Psych- Normal mood, good memory  Neck- No masses, no thyroid enlargement  CV- Regular rhythm, no murnurs  Resp- CTA to bases, no wheezes  Abdomen- Soft, non distended, non tender, no masses    Breast left-  Bilaterally symmetrical, no masses, non tender, no nipple discharge  Breast right- Bilaterally symmetrical, no masses, non tender, no nipple discharge    External genitalia- Normal female, no lesions  Urethra/meatus- Normal, no masses, non tender  Bladder- Normal, no masses, non tender  Vagina- Normal, no atrophy, no lesions, no discharge.  Prolapse : none noted   Cvx- Normal, no lesions, no discharge, No cervical motion tenderness  Uterus- Normal size, shape & consistency.  Non tender, mobile.  Adnexa- No mass, non tender  Anus/Rectum/Perineum- Not performed    Lymphatic- No palpable neck, axillary, or groin nodes  Ext- No edema, no cyanosis    Skin- No lesions, no rashes, no acanthosis nigricans      ASSESSMENT and PLAN:    Diagnoses and all orders for this visit:    1. Encounter for gynecological examination without abnormal finding (Primary)  -     IGP,rfx Aptima HPV All Pth  -     Chlamydia " trachomatis, Neisseria gonorrhoeae, Trichomonas vaginalis, PCR - Swab, Cervix        Preventative:  BREAST HEALTH- Monthly self breast exam importance and how to reviewed. MMG and/or MRI (prn) reviewed per society guidelines and her individual history. Screen: Not medically needed  CERVICAL CANCER Screening- Reviewed current ASCCP guidelines for screening w and wo cotest HPV, age specific.  Screen: Updated today  SEXUAL HEALTH: STD screening desired.  Ordered  VACCINATIONS Recommended: Covid vaccine, Flu vaccine annually.  Importance discussed, risk being unvaccinated reviewed.  Questions answered  Smoking status- NON SMOKER/VAPER        She understands the importance of having any ordered tests to be performed in a timely fashion.  The risks of not performing them include, but are not limited to, advanced cancer stages, bone loss from osteoporosis and/or subsequent increase in morbidity and/or mortality.  She is encouraged to review her results online and/or contact or office if she has questions.     Follow Up:  Return in about 1 year (around 6/4/2026) for Annual physical.        Octavio Ch, APRN  06/04/2025    [facility] [place] [address]  [facility]  [facility] [place] [address]  Dept: [phone]  Dept Fax: [phone]  Loc: [phone]

## 2025-06-05 LAB
C TRACH RRNA SPEC QL NAA+PROBE: NEGATIVE
N GONORRHOEA RRNA SPEC QL NAA+PROBE: NEGATIVE
T VAGINALIS RRNA SPEC QL NAA+PROBE: NEGATIVE

## 2025-06-06 LAB
CONV .: NORMAL
CYTOLOGIST CVX/VAG CYTO: NORMAL
CYTOLOGY CVX/VAG DOC CYTO: NORMAL
CYTOLOGY CVX/VAG DOC THIN PREP: NORMAL
DX ICD CODE: NORMAL
OTHER STN SPEC: NORMAL
SERVICE CMNT-IMP: NORMAL
STAT OF ADQ CVX/VAG CYTO-IMP: NORMAL

## 2025-06-13 ENCOUNTER — TELEPHONE (OUTPATIENT)
Dept: FAMILY MEDICINE CLINIC | Facility: CLINIC | Age: 20
End: 2025-06-13
Payer: COMMERCIAL

## 2025-06-13 NOTE — TELEPHONE ENCOUNTER
Let patient know [facility] does not manage long term controlled substance. [facility] would refer patient to behavior health to manage patients adderall.  Patient is going to stick with [facility] for now and think about the behavior health referral and switching to [facility].

## 2025-06-13 NOTE — TELEPHONE ENCOUNTER
Caller: Linnette Fischer    Relationship: Self    Best call back number:     [phone]     Who is your current provider: [facility]    Is your current provider offboarding? NO    Who would you like your new provider to be: [facility]    What are your reasons for transferring care: PREFERS HAVING AN APRN    Additional notes: PATIENT WOULD LIKE A CALLBACK TO SCHEDULE

## 2025-06-18 ENCOUNTER — OFFICE VISIT (OUTPATIENT)
Dept: FAMILY MEDICINE CLINIC | Facility: CLINIC | Age: 20
End: 2025-06-18
Payer: COMMERCIAL

## 2025-06-18 VITALS
BODY MASS INDEX: 29.08 KG/M2 | SYSTOLIC BLOOD PRESSURE: 129 MMHG | WEIGHT: 158 LBS | TEMPERATURE: 98.4 F | OXYGEN SATURATION: 100 % | HEIGHT: 62 IN | HEART RATE: 112 BPM | DIASTOLIC BLOOD PRESSURE: 74 MMHG

## 2025-06-18 DIAGNOSIS — Z76.89 ENCOUNTER TO ESTABLISH CARE: Primary | ICD-10-CM

## 2025-06-18 DIAGNOSIS — Z79.899 MEDICATION MANAGEMENT: ICD-10-CM

## 2025-06-18 DIAGNOSIS — F90.0 ATTENTION DEFICIT HYPERACTIVITY DISORDER (ADHD), PREDOMINANTLY INATTENTIVE TYPE: Chronic | ICD-10-CM

## 2025-06-18 DIAGNOSIS — R00.0 TACHYCARDIA: ICD-10-CM

## 2025-06-18 LAB
AMPHET+METHAMPHET UR QL: POSITIVE
AMPHETAMINE INTERNAL CONTROL: ABNORMAL
AMPHETAMINES UR QL: NEGATIVE
BARBITURATE INTERNAL CONTROL: ABNORMAL
BARBITURATES UR QL SCN: NEGATIVE
BENZODIAZ UR QL SCN: NEGATIVE
BENZODIAZEPINE INTERNAL CONTROL: ABNORMAL
BUPRENORPHINE INTERNAL CONTROL: ABNORMAL
BUPRENORPHINE SERPL-MCNC: NEGATIVE NG/ML
CANNABINOIDS SERPL QL: NEGATIVE
COCAINE INTERNAL CONTROL: ABNORMAL
COCAINE UR QL: NEGATIVE
EXPIRATION DATE: ABNORMAL
Lab: ABNORMAL
MDMA (ECSTASY) INTERNAL CONTROL: ABNORMAL
MDMA UR QL SCN: NEGATIVE
METHADONE INTERNAL CONTROL: ABNORMAL
METHADONE UR QL SCN: NEGATIVE
METHAMPHETAMINE INTERNAL CONTROL: ABNORMAL
MORPHINE INTERNAL CONTROL: ABNORMAL
MORPHINE/OPIATES SCREEN, URINE: NEGATIVE
OXYCODONE INTERNAL CONTROL: ABNORMAL
OXYCODONE UR QL SCN: NEGATIVE
PCP UR QL SCN: NEGATIVE
PHENCYCLIDINE INTERNAL CONTROL: ABNORMAL
THC INTERNAL CONTROL: ABNORMAL

## 2025-06-18 NOTE — PROGRESS NOTES
Chief Complaint     Establish Care and Med Refill    Patient or patient representative verbalized consent for the use of Ambient Listening during the visit with  TALHA Maldonado for chart documentation. 6/18/2025  09:46 EDT    History of Present Illness     Linnette Fischer is a 20 y.o. female who presents to [facility] .    History of Present Illness  The patient presents for ADHD management and weight concerns.    She was initiated on ADHD medication in 02/2025 and is currently taking an extended-release formulation once daily. She has been experiencing difficulty remembering to take her medication twice daily due to her busy work schedule, often leading to missed doses. She also avoids late afternoon doses as they interfere with her sleep. Previously, she was under the care of Dr. Bryan, who prescribed several anxiety medications, including fluoxetine, which proved ineffective. Subsequently, she was transitioned to Strattera, which initially showed efficacy but later ceased to work. Her next medication was Concerta, followed by Adderall, administered twice daily. She reported that the second dose of Adderall would wear off, prompting a discussion about increasing the dosage to three 15 mg tablets daily. However, she was instead switched to a single 15 mg tablet daily, which she reports as ineffective. She recalls that the twice-daily regimen was beneficial, but she struggled with adherence.    She reports not exercising as much as she should. She has always had issues with her weight. Before starting birth control, she was at her lowest weight. After starting birth control, she gained weight. She tried tirzepatide injections, but her body rejected them, causing severe vomiting and ruptured blood vessels in her eyes.    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         History      Past Medical History:   Diagnosis Date    ADHD (attention deficit  hyperactivity disorder)     Low back pain        Past Surgical History:   Procedure Laterality Date    EAR TUBES         Family History   Problem Relation Age of Onset    Osteoporosis Paternal Grandfather     Hypertension Paternal Grandfather     Diabetes Paternal Grandfather     Stroke Paternal Grandfather     Breast cancer Maternal Grandmother     Hypertension Maternal Grandmother     Cancer Maternal Grandmother     Colon cancer Neg Hx     Uterine cancer Neg Hx     Ovarian cancer Neg Hx         Current Medications        Current Outpatient Medications:     norethindrone-ethinyl estradiol-iron (Junel FE 1.5/30) 1.5-30 MG-MCG tablet, Take 1 tablet by mouth Daily., Disp: 84 tablet, Rfl: 3     Allergies     No Known Allergies    Social History       Social History     Social History Narrative    Not on file       Immunizations     Immunization:  Immunization History   Administered Date(s) Administered    COVID-19 (PFIZER) Purple Cap Monovalent 08/03/2021, 09/07/2021    DTaP, Unspecified 2004, 2004, 2004, 08/09/2005, 04/29/2008    FluMist 2-49yrs 10/23/2013, 10/29/2014, 12/03/2015    FluMist 2-49yrs (Nasal) 12/05/2012    Flublok 18+yrs 09/20/2023    Fluzone  >6mos 10/31/2024    Fluzone (or Fluarix & Flulaval for VFC) >6mos 11/09/2017, 11/15/2018, 12/20/2019    Hep A, 2 Dose 06/29/2016, 09/19/2017    Hep B, Unspecified 2004, 2004, 2004, 2004    Hepatitis B 2 Dose Vaccine Heplisav-B 06/04/2024    Hib (PRP-T) 2004, 2004, 2004, 08/09/2005    INFLUENZA NASAL UF 10/22/2010, 10/28/2011    IPV 2004, 2004, 2004, 04/29/2008    MMR 05/10/2005, 04/29/2008    Meningococcal MCV4P (Menactra) 05/23/2016, 03/10/2022    PEDS-Pneumococcal Conjugate (PCV7) 2004, 2004, 05/10/2005, 08/09/2005    Pneumococcal, Unspecified 2004, 2004, 05/10/2005, 08/09/2005    Tdap 05/23/2016    Varicella 05/10/2005, 04/29/2008          Objective  "    Objective     Vital Signs:   /74 (BP Location: Left arm, Patient Position: Sitting, Cuff Size: Adult)   Pulse 112   Temp 98.4 °F (36.9 °C) (Temporal)   Ht 157.5 cm (62\")   Wt 71.7 kg (158 lb)   SpO2 100%   BMI 28.90 kg/m²       Physical Exam  Constitutional:       Appearance: Normal appearance.   HENT:      Nose: Nose normal.      Mouth/Throat:      Mouth: Mucous membranes are moist.   Cardiovascular:      Rate and Rhythm: Normal rate and regular rhythm.      Pulses: Normal pulses.      Heart sounds: Normal heart sounds.   Pulmonary:      Effort: Pulmonary effort is normal.      Breath sounds: Normal breath sounds.   Skin:     General: Skin is warm and dry.   Neurological:      General: No focal deficit present.      Mental Status: She is alert and oriented to person, place, and time.   Psychiatric:         Mood and Affect: Mood normal.         Behavior: Behavior normal.         Physical Exam  Respiratory: Clear to auscultation, no wheezing, rales or rhonchi  Cardiovascular: Heart rate is elevated  Other: BMI is 28      Results    The following data was reviewed by: TALHA Maldonado on 06/18/2025:          Results  Labs   - Lipid Panel: HDL was high       Assessment and Plan        Assessment and Plan    Diagnoses and all orders for this visit:    1. Encounter to establish care (Primary)    2. Attention deficit hyperactivity disorder (ADHD), predominantly inattentive type  -     POC Medline 12 Panel Urine Drug Screen    3. Medication management  -     POC Medline 12 Panel Urine Drug Screen    4. Tachycardia        Assessment & Plan  1. Attention deficit hyperactivity disorder (ADHD).  - Current regimen of Adderall 15 mg extended-release is not providing adequate symptom control.  - Increased heart rate noted, likely exacerbated by recent energy drink consumption.  - Discussed the difference in drug release between extended-release and immediate-release formulations.  - Prescription for Adderall " 20 mg extended-release will be issued, to be filled by Dr. German tomorrow. If the extended-release formulation proves ineffective, a return to the twice-daily dosing schedule may be considered.    2. Weight management.  - BMI has decreased from over 30 in 05/2024 to 28 currently, indicating a positive response to the Adderall treatment.  - HDL levels are high, which is favorable.  - Discussed the appetite-suppressing effects of stimulant medications like Adderall.  - Advised to continue monitoring weight, maintain a balanced diet, and engage in regular exercise.    Follow-up  The patient will follow up in 1 month.        Follow Up        Follow Up   Return in about 4 weeks (around 7/16/2025), or if symptoms worsen or fail to improve.  Patient was given instructions and counseling regarding her condition or for health maintenance advice. Please see specific information pulled into the AVS if appropriate.

## 2025-06-26 RX ORDER — DEXTROAMPHETAMINE SACCHARATE, AMPHETAMINE ASPARTATE MONOHYDRATE, DEXTROAMPHETAMINE SULFATE AND AMPHETAMINE SULFATE 5; 5; 5; 5 MG/1; MG/1; MG/1; MG/1
20 CAPSULE, EXTENDED RELEASE ORAL EVERY MORNING
Qty: 30 CAPSULE | Refills: 0 | Status: SHIPPED | OUTPATIENT
Start: 2025-06-26

## 2025-07-16 ENCOUNTER — OFFICE VISIT (OUTPATIENT)
Dept: FAMILY MEDICINE CLINIC | Facility: CLINIC | Age: 20
End: 2025-07-16
Payer: COMMERCIAL

## 2025-07-16 VITALS
OXYGEN SATURATION: 100 % | SYSTOLIC BLOOD PRESSURE: 127 MMHG | TEMPERATURE: 98.6 F | HEIGHT: 62 IN | HEART RATE: 93 BPM | DIASTOLIC BLOOD PRESSURE: 69 MMHG | WEIGHT: 158 LBS | BODY MASS INDEX: 29.08 KG/M2

## 2025-07-16 DIAGNOSIS — Z13.1 SCREENING FOR DIABETES MELLITUS: ICD-10-CM

## 2025-07-16 DIAGNOSIS — E66.09 CLASS 1 OBESITY DUE TO EXCESS CALORIES WITHOUT SERIOUS COMORBIDITY WITH BODY MASS INDEX (BMI) OF 30.0 TO 30.9 IN ADULT: Chronic | ICD-10-CM

## 2025-07-16 DIAGNOSIS — F90.9 ATTENTION DEFICIT HYPERACTIVITY DISORDER (ADHD), UNSPECIFIED ADHD TYPE: Primary | Chronic | ICD-10-CM

## 2025-07-16 DIAGNOSIS — E66.811 CLASS 1 OBESITY DUE TO EXCESS CALORIES WITHOUT SERIOUS COMORBIDITY WITH BODY MASS INDEX (BMI) OF 30.0 TO 30.9 IN ADULT: Chronic | ICD-10-CM

## 2025-07-16 LAB
EXPIRATION DATE: NORMAL
HBA1C MFR BLD: 4.6 % (ref 4.5–5.7)
Lab: NORMAL

## 2025-07-16 NOTE — ASSESSMENT & PLAN NOTE
Patient's (Body mass index is 28.9 kg/m².) indicates that they are obese (BMI >30) with health conditions that include none . Weight is unchanged. BMI  is above average; BMI management plan is completed. We discussed portion control and increasing exercise.

## 2025-07-16 NOTE — PROGRESS NOTES
Chief Complaint     ADHD (Follow up on Aderrall )    Patient or patient representative verbalized consent for the use of Ambient Listening during the visit with  TALHA Maldonado for chart documentation. 7/16/2025  10:25 EDT    History of Present Illness     Linnette Fischer is a 20 y.o. female who presents to [facility] .    History of Present Illness  The patient presents for a follow-up visit.    She reports that her condition has improved since the last visit. Initially, she did not notice any significant changes, but over time, she has observed a marked improvement. She is due for a medication refill in approximately 10 days.    She mentions experiencing hot flashes and frequent urination, particularly after consuming sweets or sour foods. This issue has been present since her youth, and despite normal glucose levels, it persists. She expresses concern about potential diabetes, given her family history of the disease.    Additionally, she is worried about her weight. She recalls feeling unwell when she was previously on tirzepatide injections and wonders if starting at a lower dose might be more beneficial. She is hesitant to try Wegovy due to its known side effects. She is considering semaglutide as an alternative but is unsure about its efficacy and potential side effects.    Occupations: Works at a hospital    FAMILY HISTORY  The patient has a family history of diabetes.         History      Past Medical History:   Diagnosis Date    ADHD (attention deficit hyperactivity disorder)     Low back pain        Past Surgical History:   Procedure Laterality Date    EAR TUBES         Family History   Problem Relation Age of Onset    Osteoporosis Paternal Grandfather     Hypertension Paternal Grandfather     Diabetes Paternal Grandfather     Stroke Paternal Grandfather     Breast cancer Maternal Grandmother     Hypertension Maternal Grandmother     Cancer Maternal  "Grandmother     Colon cancer Neg Hx     Uterine cancer Neg Hx     Ovarian cancer Neg Hx         Current Medications        Current Outpatient Medications:     amphetamine-dextroamphetamine XR (Adderall XR) 20 MG 24 hr capsule, Take 1 capsule by mouth Every Morning, Disp: 30 capsule, Rfl: 0    norethindrone-ethinyl estradiol-iron (Junel FE 1.5/30) 1.5-30 MG-MCG tablet, Take 1 tablet by mouth Daily., Disp: 84 tablet, Rfl: 3     Allergies     No Known Allergies    Social History       Social History     Social History Narrative    Not on file       Immunizations     Immunization:  Immunization History   Administered Date(s) Administered    COVID-19 (PFIZER) Purple Cap Monovalent 08/03/2021, 09/07/2021    DTaP, Unspecified 2004, 2004, 2004, 08/09/2005, 04/29/2008    FluMist 2-49yrs 10/23/2013, 10/29/2014, 12/03/2015    FluMist 2-49yrs (Nasal) 12/05/2012    Flublok 18+yrs 09/20/2023    Fluzone  >6mos 10/31/2024    Fluzone (or Fluarix & Flulaval for VFC) >6mos 11/09/2017, 11/15/2018, 12/20/2019    Hep A, 2 Dose 06/29/2016, 09/19/2017    Hep B, Unspecified 2004, 2004, 2004, 2004    Hepatitis B 2 Dose Vaccine Heplisav-B 06/04/2024    Hib (PRP-T) 2004, 2004, 2004, 08/09/2005    INFLUENZA NASAL UF 10/22/2010, 10/28/2011    IPV 2004, 2004, 2004, 04/29/2008    MMR 05/10/2005, 04/29/2008    Meningococcal MCV4P (Menactra) 05/23/2016, 03/10/2022    PEDS-Pneumococcal Conjugate (PCV7) 2004, 2004, 05/10/2005, 08/09/2005    Pneumococcal, Unspecified 2004, 2004, 05/10/2005, 08/09/2005    Tdap 05/23/2016    Varicella 05/10/2005, 04/29/2008          Objective     Objective     Vital Signs:   /69 (BP Location: Left arm, Patient Position: Sitting, Cuff Size: Adult)   Pulse 93   Temp 98.6 °F (37 °C) (Temporal)   Ht 157.5 cm (62\")   Wt 71.7 kg (158 lb)   SpO2 100%   BMI 28.90 kg/m²       Physical Exam  Constitutional:       " Appearance: Normal appearance.   HENT:      Nose: Nose normal.      Mouth/Throat:      Mouth: Mucous membranes are moist.   Cardiovascular:      Rate and Rhythm: Normal rate and regular rhythm.      Pulses: Normal pulses.      Heart sounds: Normal heart sounds.   Pulmonary:      Effort: Pulmonary effort is normal.      Breath sounds: Normal breath sounds.   Skin:     General: Skin is warm and dry.   Neurological:      General: No focal deficit present.      Mental Status: She is alert and oriented to person, place, and time.   Psychiatric:         Mood and Affect: Mood normal.         Behavior: Behavior normal.         Physical Exam  Respiratory: Clear to auscultation, no wheezing, rales or rhonchi  Cardiovascular: Regular rate and rhythm, no murmurs, rubs, or gallops  Other: BMI is 28      Results    The following data was reviewed by: TALHA Maldonado on 07/16/2025:          Results  Labs   - Glucose: 88 mg/dL       Assessment and Plan        Assessment and Plan    Diagnoses and all orders for this visit:    1. Attention deficit hyperactivity disorder (ADHD), unspecified ADHD type (Primary)  Assessment & Plan:  Psychological condition is stable.  Continue current treatment regimen.  Psychological condition  will be reassessed in 6 months.      2. Class 1 obesity due to excess calories without serious comorbidity with body mass index (BMI) of 30.0 to 30.9 in adult  Assessment & Plan:  Patient's (Body mass index is 28.9 kg/m².) indicates that they are obese (BMI >30) with health conditions that include none . Weight is unchanged. BMI  is above average; BMI management plan is completed. We discussed portion control and increasing exercise.       3. Screening for diabetes mellitus  -     POC Glycosylated Hemoglobin (Hb A1C)        Assessment & Plan  1. Medication Management  - Due for a refill of current medication in approximately 7 to 10 days.  - Prescription will be forwarded to Dr. Mclean for renewal.    2.  Hot flashes and frequent urination  - Glucose level recorded at 88 at 3:00 PM, within the normal range.  - Heart and lungs sound normal.  - Discussed the possibility of checking A1c to further assess glucose control.  - A1c test will be conducted.    3. Weight management  - BMI is currently 28.  - Phentermine is not an option due to concurrent use of Adderall.  - Injectable medications are the only alternative, costing around $600 per month out-of-pocket.  - Expressed concerns about side effects from previous use of tirzepatide and considering starting at a lower dose.  - Informed that semaglutide has higher gastrointestinal issues compared to tirzepatide.    Follow-up  - A follow-up appointment is scheduled in 3 months for Adderall management.        Follow Up        Follow Up   Return if symptoms worsen or fail to improve.  Patient was given instructions and counseling regarding her condition or for health maintenance advice. Please see specific information pulled into the AVS if appropriate.    Answers submitted by the patient for this visit:  Chronic Condition Follow-up (Submitted on 7/16/2025)  Chief Complaint: PCP follow-up  other: Yes  Medication compliance: all of the time  Treatment barriers: no complaince problems  Exercise: rarely

## 2025-07-30 DIAGNOSIS — F90.0 ATTENTION DEFICIT HYPERACTIVITY DISORDER (ADHD), PREDOMINANTLY INATTENTIVE TYPE: Chronic | ICD-10-CM

## 2025-07-30 RX ORDER — DEXTROAMPHETAMINE SACCHARATE, AMPHETAMINE ASPARTATE MONOHYDRATE, DEXTROAMPHETAMINE SULFATE AND AMPHETAMINE SULFATE 5; 5; 5; 5 MG/1; MG/1; MG/1; MG/1
20 CAPSULE, EXTENDED RELEASE ORAL EVERY MORNING
Qty: 30 CAPSULE | Refills: 0 | Status: SHIPPED | OUTPATIENT
Start: 2025-07-30